# Patient Record
Sex: FEMALE | Race: BLACK OR AFRICAN AMERICAN | NOT HISPANIC OR LATINO | Employment: OTHER | URBAN - METROPOLITAN AREA
[De-identification: names, ages, dates, MRNs, and addresses within clinical notes are randomized per-mention and may not be internally consistent; named-entity substitution may affect disease eponyms.]

---

## 2018-06-03 ENCOUNTER — APPOINTMENT (EMERGENCY)
Dept: RADIOLOGY | Facility: HOSPITAL | Age: 23
End: 2018-06-03
Attending: EMERGENCY MEDICINE
Payer: COMMERCIAL

## 2018-06-03 ENCOUNTER — HOSPITAL ENCOUNTER (EMERGENCY)
Facility: HOSPITAL | Age: 23
Discharge: HOME | End: 2018-06-03
Attending: EMERGENCY MEDICINE
Payer: COMMERCIAL

## 2018-06-03 VITALS
DIASTOLIC BLOOD PRESSURE: 63 MMHG | OXYGEN SATURATION: 100 % | TEMPERATURE: 98.3 F | RESPIRATION RATE: 19 BRPM | HEART RATE: 83 BPM | BODY MASS INDEX: 23.92 KG/M2 | SYSTOLIC BLOOD PRESSURE: 105 MMHG | WEIGHT: 135 LBS | HEIGHT: 63 IN

## 2018-06-03 DIAGNOSIS — L02.91 ABSCESS: Primary | ICD-10-CM

## 2018-06-03 LAB
B-HCG UR QL: NEGATIVE
BACTERIA URNS QL MICRO: 1 /UL
BILIRUB UR QL STRIP.AUTO: NEGATIVE MG/DL
CLARITY UR REFRACT.AUTO: ABNORMAL
COLOR UR AUTO: YELLOW
GLUCOSE UR STRIP.AUTO-MCNC: NEGATIVE MG/DL
HGB UR QL STRIP.AUTO: NEGATIVE
HYALINE CASTS #/AREA URNS LPF: ABNORMAL /LPF
KETONES UR STRIP.AUTO-MCNC: NEGATIVE MG/DL
LEUKOCYTE ESTERASE UR QL STRIP.AUTO: NEGATIVE
NITRITE UR QL STRIP.AUTO: NEGATIVE
PH UR STRIP.AUTO: 7 [PH]
PROT UR QL STRIP.AUTO: 1
RBC #/AREA URNS HPF: ABNORMAL /HPF
SP GR UR REFRACT.AUTO: 1.02
SQUAMOUS URNS QL MICRO: 1 /HPF
UROBILINOGEN UR STRIP-ACNC: 0.2 EU/DL
WBC #/AREA URNS HPF: ABNORMAL /HPF

## 2018-06-03 PROCEDURE — 99283 EMERGENCY DEPT VISIT LOW MDM: CPT | Mod: 25

## 2018-06-03 PROCEDURE — 81001 URINALYSIS AUTO W/SCOPE: CPT | Performed by: PHYSICIAN ASSISTANT

## 2018-06-03 PROCEDURE — 72170 X-RAY EXAM OF PELVIS: CPT

## 2018-06-03 PROCEDURE — 63700000 HC SELF-ADMINISTRABLE DRUG: Performed by: PHYSICIAN ASSISTANT

## 2018-06-03 RX ORDER — IBUPROFEN 600 MG/1
600 TABLET ORAL EVERY 6 HOURS PRN
Qty: 20 TABLET | Refills: 0 | OUTPATIENT
Start: 2018-06-03 | End: 2022-11-21

## 2018-06-03 RX ORDER — SULFAMETHOXAZOLE AND TRIMETHOPRIM 800; 160 MG/1; MG/1
1 TABLET ORAL 2 TIMES DAILY
Qty: 14 TABLET | Refills: 0 | Status: SHIPPED | OUTPATIENT
Start: 2018-06-03 | End: 2018-06-10

## 2018-06-03 RX ORDER — IBUPROFEN 600 MG/1
600 TABLET ORAL ONCE
Status: COMPLETED | OUTPATIENT
Start: 2018-06-03 | End: 2018-06-03

## 2018-06-03 RX ADMIN — IBUPROFEN 600 MG: 600 TABLET, FILM COATED ORAL at 18:09

## 2018-06-03 ASSESSMENT — ENCOUNTER SYMPTOMS
PALPITATIONS: 0
COUGH: 0
DYSURIA: 0
COLOR CHANGE: 0
VOMITING: 0
EYE PAIN: 0
ABDOMINAL PAIN: 0
SHORTNESS OF BREATH: 0
SORE THROAT: 0
SEIZURES: 0
BACK PAIN: 0
ARTHRALGIAS: 0
CHILLS: 0
FEVER: 0
HEMATURIA: 0

## 2018-06-04 NOTE — ED ATTESTATION NOTE
The patient was evaluated and managed by the physician assistant / nurse practitioner.  santiago Jefferson MD  06/03/18 8796

## 2019-12-18 ENCOUNTER — HOSPITAL ENCOUNTER (EMERGENCY)
Facility: HOSPITAL | Age: 24
Discharge: HOME | End: 2019-12-18
Attending: EMERGENCY MEDICINE
Payer: COMMERCIAL

## 2019-12-18 ENCOUNTER — APPOINTMENT (EMERGENCY)
Dept: RADIOLOGY | Facility: HOSPITAL | Age: 24
End: 2019-12-18
Payer: COMMERCIAL

## 2019-12-18 VITALS
HEIGHT: 63 IN | DIASTOLIC BLOOD PRESSURE: 56 MMHG | RESPIRATION RATE: 16 BRPM | OXYGEN SATURATION: 100 % | SYSTOLIC BLOOD PRESSURE: 105 MMHG | TEMPERATURE: 99.7 F | WEIGHT: 150 LBS | HEART RATE: 102 BPM | BODY MASS INDEX: 26.58 KG/M2

## 2019-12-18 DIAGNOSIS — R10.9 ABDOMINAL PAIN, VOMITING, AND DIARRHEA: Primary | ICD-10-CM

## 2019-12-18 DIAGNOSIS — K52.9 COLITIS: ICD-10-CM

## 2019-12-18 DIAGNOSIS — R11.10 ABDOMINAL PAIN, VOMITING, AND DIARRHEA: Primary | ICD-10-CM

## 2019-12-18 DIAGNOSIS — R19.7 ABDOMINAL PAIN, VOMITING, AND DIARRHEA: Primary | ICD-10-CM

## 2019-12-18 LAB
ALBUMIN SERPL-MCNC: 4.1 G/DL (ref 3.4–5)
ALP SERPL-CCNC: 37 IU/L (ref 35–126)
ALT SERPL-CCNC: 18 IU/L (ref 11–54)
ANION GAP SERPL CALC-SCNC: 9 MEQ/L (ref 3–15)
AST SERPL-CCNC: 20 IU/L (ref 15–41)
B-HCG UR QL: NEGATIVE
BACTERIA URNS QL MICRO: ABNORMAL /HPF
BASOPHILS # BLD: 0.02 K/UL (ref 0.01–0.1)
BASOPHILS NFR BLD: 0.2 %
BILIRUB SERPL-MCNC: 0.7 MG/DL (ref 0.3–1.2)
BILIRUB UR QL STRIP.AUTO: NEGATIVE MG/DL
BUN SERPL-MCNC: 10 MG/DL (ref 8–20)
CALCIUM SERPL-MCNC: 9.4 MG/DL (ref 8.9–10.3)
CHLORIDE SERPL-SCNC: 112 MEQ/L (ref 98–109)
CLARITY UR REFRACT.AUTO: CLEAR
CO2 SERPL-SCNC: 18 MEQ/L (ref 22–32)
COLOR UR AUTO: YELLOW
CREAT SERPL-MCNC: 0.7 MG/DL
DIFFERENTIAL METHOD BLD: ABNORMAL
EOSINOPHIL # BLD: 0.09 K/UL (ref 0.04–0.36)
EOSINOPHIL NFR BLD: 1 %
ERYTHROCYTE [DISTWIDTH] IN BLOOD BY AUTOMATED COUNT: 13.2 % (ref 11.7–14.4)
GFR SERPL CREATININE-BSD FRML MDRD: >60 ML/MIN/1.73M*2
GLUCOSE SERPL-MCNC: 88 MG/DL (ref 70–99)
GLUCOSE UR STRIP.AUTO-MCNC: NEGATIVE MG/DL
HCG UR QL: NEGATIVE
HCT VFR BLDCO AUTO: 41.5 %
HGB BLD-MCNC: 13.8 G/DL (ref 11.8–15.7)
HGB UR QL STRIP.AUTO: ABNORMAL
HYALINE CASTS #/AREA URNS LPF: ABNORMAL /LPF
IMM GRANULOCYTES # BLD AUTO: 0.01 K/UL (ref 0–0.08)
IMM GRANULOCYTES NFR BLD AUTO: 0.1 %
KETONES UR STRIP.AUTO-MCNC: NEGATIVE MG/DL
LEUKOCYTE ESTERASE UR QL STRIP.AUTO: NEGATIVE
LIPASE SERPL-CCNC: 24 U/L (ref 20–51)
LYMPHOCYTES # BLD: 0.68 K/UL (ref 1.2–3.5)
LYMPHOCYTES NFR BLD: 7.3 %
MCH RBC QN AUTO: 29.6 PG (ref 28–33.2)
MCHC RBC AUTO-ENTMCNC: 33.3 G/DL (ref 32.2–35.5)
MCV RBC AUTO: 88.9 FL (ref 83–98)
MONOCYTES # BLD: 0.48 K/UL (ref 0.28–0.8)
MONOCYTES NFR BLD: 5.1 %
NEUTROPHILS # BLD: 8.05 K/UL (ref 1.7–7)
NEUTS SEG NFR BLD: 86.3 %
NITRITE UR QL STRIP.AUTO: NEGATIVE
NRBC BLD-RTO: 0 %
PDW BLD AUTO: 9.7 FL (ref 9.4–12.3)
PH UR STRIP.AUTO: 6 [PH]
PLATELET # BLD AUTO: 234 K/UL
POCT TEST: NORMAL
POTASSIUM SERPL-SCNC: 4 MEQ/L (ref 3.6–5.1)
PROT SERPL-MCNC: 6.9 G/DL (ref 6–8.2)
PROT UR QL STRIP.AUTO: NEGATIVE
RBC # BLD AUTO: 4.67 M/UL (ref 3.93–5.22)
RBC #/AREA URNS HPF: ABNORMAL /HPF
SODIUM SERPL-SCNC: 139 MEQ/L (ref 136–144)
SP GR UR REFRACT.AUTO: 1.02
SQUAMOUS URNS QL MICRO: 2 /HPF
UROBILINOGEN UR STRIP-ACNC: 0.2 EU/DL
WBC # BLD AUTO: 9.33 K/UL
WBC #/AREA URNS HPF: ABNORMAL /HPF

## 2019-12-18 PROCEDURE — 96374 THER/PROPH/DIAG INJ IV PUSH: CPT | Mod: 59

## 2019-12-18 PROCEDURE — 80053 COMPREHEN METABOLIC PANEL: CPT | Performed by: PHYSICIAN ASSISTANT

## 2019-12-18 PROCEDURE — 25000000 HC PHARMACY GENERAL: Performed by: PHYSICIAN ASSISTANT

## 2019-12-18 PROCEDURE — 3E033GC INTRODUCTION OF OTHER THERAPEUTIC SUBSTANCE INTO PERIPHERAL VEIN, PERCUTANEOUS APPROACH: ICD-10-PCS | Performed by: EMERGENCY MEDICINE

## 2019-12-18 PROCEDURE — 3E0337Z INTRODUCTION OF ELECTROLYTIC AND WATER BALANCE SUBSTANCE INTO PERIPHERAL VEIN, PERCUTANEOUS APPROACH: ICD-10-PCS | Performed by: EMERGENCY MEDICINE

## 2019-12-18 PROCEDURE — 83690 ASSAY OF LIPASE: CPT | Performed by: PHYSICIAN ASSISTANT

## 2019-12-18 PROCEDURE — 84703 CHORIONIC GONADOTROPIN ASSAY: CPT | Performed by: PHYSICIAN ASSISTANT

## 2019-12-18 PROCEDURE — 63700000 HC SELF-ADMINISTRABLE DRUG: Performed by: PHYSICIAN ASSISTANT

## 2019-12-18 PROCEDURE — 63600000 HC DRUGS/DETAIL CODE: Performed by: PHYSICIAN ASSISTANT

## 2019-12-18 PROCEDURE — 96376 TX/PRO/DX INJ SAME DRUG ADON: CPT | Mod: 59

## 2019-12-18 PROCEDURE — 96361 HYDRATE IV INFUSION ADD-ON: CPT | Mod: 59

## 2019-12-18 PROCEDURE — 36415 COLL VENOUS BLD VENIPUNCTURE: CPT | Performed by: PHYSICIAN ASSISTANT

## 2019-12-18 PROCEDURE — 85025 COMPLETE CBC W/AUTO DIFF WBC: CPT | Performed by: PHYSICIAN ASSISTANT

## 2019-12-18 PROCEDURE — 96375 TX/PRO/DX INJ NEW DRUG ADDON: CPT | Mod: 59

## 2019-12-18 PROCEDURE — 81001 URINALYSIS AUTO W/SCOPE: CPT

## 2019-12-18 PROCEDURE — 3E033NZ INTRODUCTION OF ANALGESICS, HYPNOTICS, SEDATIVES INTO PERIPHERAL VEIN, PERCUTANEOUS APPROACH: ICD-10-PCS | Performed by: EMERGENCY MEDICINE

## 2019-12-18 PROCEDURE — 25800000 HC PHARMACY IV SOLUTIONS: Performed by: PHYSICIAN ASSISTANT

## 2019-12-18 PROCEDURE — 99284 EMERGENCY DEPT VISIT MOD MDM: CPT | Mod: 25

## 2019-12-18 PROCEDURE — 81001 URINALYSIS AUTO W/SCOPE: CPT | Performed by: EMERGENCY MEDICINE

## 2019-12-18 PROCEDURE — 74177 CT ABD & PELVIS W/CONTRAST: CPT

## 2019-12-18 PROCEDURE — 63600105 HC IODINE BASED CONTRAST: Performed by: PHYSICIAN ASSISTANT

## 2019-12-18 RX ORDER — ACETAMINOPHEN 325 MG/1
975 TABLET ORAL ONCE
Status: COMPLETED | OUTPATIENT
Start: 2019-12-18 | End: 2019-12-18

## 2019-12-18 RX ORDER — MORPHINE SULFATE 2 MG/ML
2 INJECTION, SOLUTION INTRAMUSCULAR; INTRAVENOUS ONCE
Status: COMPLETED | OUTPATIENT
Start: 2019-12-18 | End: 2019-12-18

## 2019-12-18 RX ORDER — METRONIDAZOLE 500 MG/1
500 TABLET ORAL ONCE
Status: COMPLETED | OUTPATIENT
Start: 2019-12-18 | End: 2019-12-18

## 2019-12-18 RX ORDER — FAMOTIDINE 10 MG/ML
20 INJECTION INTRAVENOUS ONCE
Status: COMPLETED | OUTPATIENT
Start: 2019-12-18 | End: 2019-12-18

## 2019-12-18 RX ORDER — ONDANSETRON HYDROCHLORIDE 2 MG/ML
4 INJECTION, SOLUTION INTRAVENOUS ONCE
Status: COMPLETED | OUTPATIENT
Start: 2019-12-18 | End: 2019-12-18

## 2019-12-18 RX ORDER — CIPROFLOXACIN 500 MG/1
500 TABLET ORAL ONCE
Status: COMPLETED | OUTPATIENT
Start: 2019-12-18 | End: 2019-12-18

## 2019-12-18 RX ORDER — CIPROFLOXACIN 500 MG/1
500 TABLET ORAL 2 TIMES DAILY
Qty: 13 TABLET | Refills: 0 | Status: SHIPPED | OUTPATIENT
Start: 2019-12-18 | End: 2019-12-25

## 2019-12-18 RX ORDER — METRONIDAZOLE 500 MG/1
500 TABLET ORAL 3 TIMES DAILY
Qty: 20 TABLET | Refills: 0 | Status: SHIPPED | OUTPATIENT
Start: 2019-12-18 | End: 2019-12-25

## 2019-12-18 RX ORDER — ONDANSETRON 4 MG/1
4 TABLET, ORALLY DISINTEGRATING ORAL EVERY 8 HOURS PRN
Qty: 6 TABLET | Refills: 0 | Status: SHIPPED | OUTPATIENT
Start: 2019-12-18 | End: 2023-05-04

## 2019-12-18 RX ORDER — MORPHINE SULFATE 2 MG/ML
4 INJECTION, SOLUTION INTRAMUSCULAR; INTRAVENOUS ONCE
Status: COMPLETED | OUTPATIENT
Start: 2019-12-18 | End: 2019-12-18

## 2019-12-18 RX ADMIN — MORPHINE SULFATE 4 MG: 2 INJECTION, SOLUTION INTRAMUSCULAR; INTRAVENOUS at 15:09

## 2019-12-18 RX ADMIN — METRONIDAZOLE 500 MG: 500 TABLET, FILM COATED ORAL at 19:29

## 2019-12-18 RX ADMIN — MORPHINE SULFATE 2 MG: 2 INJECTION, SOLUTION INTRAMUSCULAR; INTRAVENOUS at 14:34

## 2019-12-18 RX ADMIN — ONDANSETRON HYDROCHLORIDE 4 MG: 2 SOLUTION INTRAMUSCULAR; INTRAVENOUS at 14:33

## 2019-12-18 RX ADMIN — IOHEXOL 85 ML: 350 INJECTION, SOLUTION INTRAVENOUS at 17:24

## 2019-12-18 RX ADMIN — SODIUM CHLORIDE 2000 ML: 9 INJECTION, SOLUTION INTRAVENOUS at 14:10

## 2019-12-18 RX ADMIN — ACETAMINOPHEN 975 MG: 325 TABLET, FILM COATED ORAL at 18:48

## 2019-12-18 RX ADMIN — FAMOTIDINE 20 MG: 10 INJECTION INTRAVENOUS at 14:34

## 2019-12-18 RX ADMIN — CIPROFLOXACIN HYDROCHLORIDE 500 MG: 500 TABLET, FILM COATED ORAL at 19:29

## 2019-12-18 ASSESSMENT — ENCOUNTER SYMPTOMS
HEADACHES: 0
SEIZURES: 0
EYE PAIN: 0
DIARRHEA: 1
ARTHRALGIAS: 0
PALPITATIONS: 0
COUGH: 0
FEVER: 0
SHORTNESS OF BREATH: 0
FATIGUE: 0
ABDOMINAL PAIN: 1
CHILLS: 1
DYSURIA: 0
MYALGIAS: 0
HEMATURIA: 0
SORE THROAT: 0
NAUSEA: 1
COLOR CHANGE: 0
BACK PAIN: 0
VOMITING: 1
DIFFICULTY URINATING: 0

## 2019-12-18 NOTE — ED PROVIDER NOTES
HPI     Chief Complaint   Patient presents with   • Flu Symptoms       24 year old female who presents to the ED with multiple episodes of nasuea, vomiting, diarrhea, as well as diffused abdominal cramping since yesterday evening.  Associated chills.  She states that she has been unable to keep down food or drinks. She denies recent travel or sick contacts. She reports approximately 3 episodes of vomiting and 4 episodes of watery diarrhea.  Denies melena/.  She denies urinary symptoms, back pain, fever, chest pain, difficulty breathing, cough.      History provided by:  Patient   used: No    Abdominal Pain   Pain location:  Epigastric and RLQ  Pain quality: aching    Pain severity:  Moderate  Onset quality:  Gradual  Duration:  1 day  Timing:  Constant  Progression:  Worsening  Chronicity:  New  Relieved by:  None tried  Worsened by:  Palpation and movement  Ineffective treatments:  None tried  Associated symptoms: chills, diarrhea, nausea and vomiting    Associated symptoms: no chest pain, no cough, no dysuria, no fatigue, no fever, no hematuria, no shortness of breath and no sore throat         Patient History     History reviewed. No pertinent past medical history.    History reviewed. No pertinent surgical history.    History reviewed. No pertinent family history.    Social History     Tobacco Use   • Smoking status: Never Smoker   • Smokeless tobacco: Never Used   Substance Use Topics   • Alcohol use: No   • Drug use: Yes     Types: Marijuana       Systems Reviewed from Nursing Triage:          Review of Systems     Review of Systems   Constitutional: Positive for chills. Negative for fatigue and fever.   HENT: Negative for ear pain and sore throat.    Eyes: Negative for pain and visual disturbance.   Respiratory: Negative for cough and shortness of breath.    Cardiovascular: Negative for chest pain and palpitations.   Gastrointestinal: Positive for abdominal pain, diarrhea, nausea and  "vomiting.   Genitourinary: Negative for difficulty urinating, dysuria, hematuria and pelvic pain.   Musculoskeletal: Negative for arthralgias, back pain and myalgias.   Skin: Negative for color change and rash.   Neurological: Negative for seizures, syncope and headaches.   All other systems reviewed and are negative.       Physical Exam     ED Triage Vitals   Temp Heart Rate Resp BP SpO2   12/18/19 1323 12/18/19 1322 12/18/19 1323 12/18/19 1323 12/18/19 1322   37.2 °C (98.9 °F) (!) 116 15 123/68 97 %      Temp Source Heart Rate Source Patient Position BP Location FiO2 (%) (Set)   12/18/19 1323 12/18/19 1323 12/18/19 1323 12/18/19 1323 --   Oral Monitor Sitting Left upper arm                      Patient Vitals for the past 24 hrs:   BP Temp Temp src Pulse Resp SpO2 Height Weight   12/18/19 1342 -- -- -- -- -- -- 1.6 m (5' 3\") 68 kg (150 lb)   12/18/19 1323 123/68 37.2 °C (98.9 °F) Oral 89 15 100 % -- --   12/18/19 1322 -- -- -- (!) 116 -- 97 % -- --                                       Physical Exam   Constitutional: She is oriented to person, place, and time. She appears well-developed and well-nourished. No distress.   HENT:   Head: Normocephalic and atraumatic.   Eyes: Conjunctivae are normal. No scleral icterus.   Neck: Neck supple.   Cardiovascular: Normal rate, regular rhythm, normal heart sounds and intact distal pulses.   Pulmonary/Chest: Effort normal and breath sounds normal. No respiratory distress.   Abdominal: Soft. Bowel sounds are normal. There is tenderness in the right lower quadrant and epigastric area.   RLQ and mid-epigastric    Musculoskeletal: She exhibits no edema.   Neurological: She is alert and oriented to person, place, and time. She has normal strength.   Skin: Skin is warm and dry. Capillary refill takes less than 2 seconds. She is not diaphoretic.   Psychiatric: She has a normal mood and affect. Her behavior is normal.   Nursing note and vitals reviewed.           Procedures    ED " Course & MDM     Labs Reviewed   RSV AND INFLUENZA A/B NUCLEIC ACIDS, PCR   URINALYSIS REFLEX CULTURE (ED AND OUTPATIENT ONLY)    Narrative:     The following orders were created for panel order Urinalysis w/ reflex culture.  Procedure                               Abnormality         Status                     ---------                               -----------         ------                     UA Reflex to Culture (Ma...[[id number]]                                                   Please view results for these tests on the individual orders.   UA REFLEX CULTURE (MACROSCOPIC)   BEDSIDE PREGNANCY, URINE       No orders to display               MDM  Number of Diagnoses or Management Options  Diagnosis management comments: 24-year-old female presenting for abdominal pain, nausea, vomiting, diarrhea.  Patient is in NAD.  She is intermittently tachycardic with otherwise stable vitals.  No peritoneal signs on exam.  Nonseptic appearing    Labs obtained, CT ordered, patient given IV fluids, antiemetics and analgesics           ED Course as of Dec 18 1541   Wed Dec 18, 2019   1540 Contaminated UA, no urinary sx, likely noninfectious    Urinalysis w/ reflex culture(!) [RM]      ED Course User Index  [RM] Cj Thorpe PA C      Scribe Attestation  By signing my name below, I, Weston Mcclelland, attest that this documentation has been prepared under the direction and in the presence of ROSARIO Thorpe PA-C.    12/18/2019 2:01 PM    Weston Greene  12/18/19 1405       Cj Thorpe PA C  12/18/19 1543

## 2019-12-19 NOTE — DISCHARGE INSTRUCTIONS
Increase fluids, rest  Followup with gastroenterology within 2-3 days. Bring paperwork from today's visit and discuss all labs or imaging results   Return to the ER immediately for any worsening symptoms or other concerns

## 2019-12-19 NOTE — ED ATTESTATION NOTE
The patient was evaluated and managed by the physician assistant.  This case was discussed with me and I was available for consultation.       Hans Mustafa MD  12/18/19 5823

## 2020-10-27 ENCOUNTER — HOSPITAL ENCOUNTER (EMERGENCY)
Facility: HOSPITAL | Age: 25
Discharge: HOME | End: 2020-10-27
Attending: EMERGENCY MEDICINE
Payer: COMMERCIAL

## 2020-10-27 ENCOUNTER — APPOINTMENT (EMERGENCY)
Dept: RADIOLOGY | Facility: HOSPITAL | Age: 25
End: 2020-10-27
Payer: COMMERCIAL

## 2020-10-27 VITALS
HEART RATE: 80 BPM | OXYGEN SATURATION: 100 % | WEIGHT: 150 LBS | SYSTOLIC BLOOD PRESSURE: 110 MMHG | DIASTOLIC BLOOD PRESSURE: 55 MMHG | RESPIRATION RATE: 19 BRPM | TEMPERATURE: 98.6 F | BODY MASS INDEX: 26.58 KG/M2 | HEIGHT: 63 IN

## 2020-10-27 DIAGNOSIS — J06.9 VIRAL UPPER RESPIRATORY ILLNESS: Primary | ICD-10-CM

## 2020-10-27 DIAGNOSIS — J45.909 ASTHMA, UNSPECIFIED ASTHMA SEVERITY, UNSPECIFIED WHETHER COMPLICATED, UNSPECIFIED WHETHER PERSISTENT: ICD-10-CM

## 2020-10-27 PROCEDURE — 99283 EMERGENCY DEPT VISIT LOW MDM: CPT | Mod: 25

## 2020-10-27 PROCEDURE — 71045 X-RAY EXAM CHEST 1 VIEW: CPT

## 2020-10-27 PROCEDURE — 63700000 HC SELF-ADMINISTRABLE DRUG: Performed by: PHYSICIAN ASSISTANT

## 2020-10-27 RX ORDER — PREDNISONE 20 MG/1
TABLET ORAL
Status: DISCONTINUED
Start: 2020-10-27 | End: 2020-10-27 | Stop reason: HOSPADM

## 2020-10-27 RX ORDER — ALBUTEROL SULFATE 90 UG/1
INHALANT RESPIRATORY (INHALATION)
Status: DISCONTINUED
Start: 2020-10-27 | End: 2020-10-27 | Stop reason: HOSPADM

## 2020-10-27 RX ORDER — ALBUTEROL SULFATE 90 UG/1
2 INHALANT RESPIRATORY (INHALATION) EVERY 20 MIN
Status: ACTIVE | OUTPATIENT
Start: 2020-10-27 | End: 2020-10-27

## 2020-10-27 RX ORDER — PREDNISONE 50 MG/1
50 TABLET ORAL DAILY
Qty: 10 TABLET | Refills: 0 | Status: SHIPPED | OUTPATIENT
Start: 2020-10-27 | End: 2023-05-04

## 2020-10-27 RX ORDER — PREDNISONE 50 MG/1
50 TABLET ORAL DAILY
Qty: 10 TABLET | Refills: 0 | Status: SHIPPED | OUTPATIENT
Start: 2020-10-27 | End: 2020-10-27 | Stop reason: SDUPTHER

## 2020-10-27 RX ADMIN — ALBUTEROL SULFATE 2 PUFF: 90 AEROSOL, METERED RESPIRATORY (INHALATION) at 15:02

## 2020-10-27 RX ADMIN — ALBUTEROL SULFATE 2 PUFF: 90 AEROSOL, METERED RESPIRATORY (INHALATION) at 15:32

## 2020-10-27 RX ADMIN — PREDNISONE 50 MG: 20 TABLET ORAL at 15:01

## 2020-10-27 ASSESSMENT — ENCOUNTER SYMPTOMS
DIARRHEA: 0
SPEECH DIFFICULTY: 0
DIAPHORESIS: 0
VOMITING: 0
HEMATURIA: 0
DIFFICULTY URINATING: 0
HEADACHES: 0
WHEEZING: 0
AGITATION: 0
WEAKNESS: 0
SHORTNESS OF BREATH: 1
ABDOMINAL PAIN: 0
SORE THROAT: 1
NAUSEA: 0
SEIZURES: 0
FEVER: 0
NECK PAIN: 0
COUGH: 0
FACIAL SWELLING: 0
COLOR CHANGE: 0
BACK PAIN: 0
ACTIVITY CHANGE: 0
RHINORRHEA: 1

## 2020-10-27 NOTE — DISCHARGE INSTRUCTIONS
Currently you have symptoms which seem consistent with viral upper respiratory illness that is exacerbating asthma.  I will discharge you with a prescription for prednisone.  I would take over-the-counter symptomatic treatment in order to help with sore throat.  Please follow-up with primary care provider for persistent symptoms.  Please follow-up in the emergency department for any new or worsening symptoms or concern.

## 2020-10-27 NOTE — ED PROVIDER NOTES
HPI     Chief Complaint   Patient presents with   • Asthma       25-year-old female with previous history of asthma presents complaining of shortness of breath and chest tightness over the last 2 days.  Patient also mentions that she has a sore throat.  Patient has had a productive cough.  Patient denies any nausea, vomiting or fever.  Patient denies any PERC criteria.  She has used her bronchodilator at home which has helped with her symptoms.  Patient was just recently Covid tested couple days back which was negative.           Patient History     Past Medical History:   Diagnosis Date   • Asthma        History reviewed. No pertinent surgical history.    History reviewed. No pertinent family history.    Social History     Tobacco Use   • Smoking status: Never Smoker   • Smokeless tobacco: Never Used   Substance Use Topics   • Alcohol use: No   • Drug use: Yes     Types: Marijuana       Systems Reviewed from Nursing Triage:          Review of Systems     Review of Systems   Constitutional: Negative for activity change, diaphoresis and fever.   HENT: Positive for rhinorrhea and sore throat. Negative for facial swelling.    Eyes: Negative for visual disturbance.   Respiratory: Positive for shortness of breath. Negative for cough and wheezing.    Cardiovascular: Positive for chest pain. Negative for leg swelling.   Gastrointestinal: Negative for abdominal pain, diarrhea, nausea and vomiting.   Genitourinary: Negative for difficulty urinating and hematuria.   Musculoskeletal: Negative for back pain and neck pain.   Skin: Negative for color change and pallor.   Neurological: Negative for seizures, syncope, speech difficulty, weakness and headaches.   Psychiatric/Behavioral: Negative for agitation and behavioral problems.   All other systems reviewed and are negative.       Physical Exam     ED Triage Vitals   Temp Heart Rate Resp BP SpO2   10/27/20 1333 10/27/20 1333 10/27/20 1333 10/27/20 1333 10/27/20 1333   37 °C  "(98.6 °F) 95 18 117/60 99 %      Temp Source Heart Rate Source Patient Position BP Location FiO2 (%) (Set)   10/27/20 1333 -- 10/27/20 1531 10/27/20 1531 --   Temporal  Lying Left upper arm        Pulse Ox %: 99 % (10/27/20 1721)  Pulse Ox Interpretation: Normal (10/27/20 1721)  Heart Rate: 85 (10/27/20 1721)  Rhythm Strip Interpretation: Normal Sinus Rhythm (10/27/20 1721)    Patient Vitals for the past 24 hrs:   BP Temp Temp src Pulse Resp SpO2 Height Weight   10/27/20 1531 (!) 107/58 -- -- 85 20 99 % -- --   10/27/20 1333 117/60 37 °C (98.6 °F) Temporal 95 18 99 % 1.6 m (5' 3\") 68 kg (150 lb)                                          Physical Exam  Vitals signs and nursing note reviewed.   Constitutional:       Appearance: She is well-developed.   HENT:      Head: Normocephalic and atraumatic.      Mouth/Throat:      Pharynx: Posterior oropharyngeal erythema present.   Eyes:      Conjunctiva/sclera: Conjunctivae normal.   Neck:      Musculoskeletal: Normal range of motion.   Cardiovascular:      Rate and Rhythm: Normal rate and regular rhythm.   Pulmonary:      Effort: Pulmonary effort is normal.      Breath sounds: Normal breath sounds.   Abdominal:      General: There is no distension.      Palpations: Abdomen is soft. There is no mass.      Tenderness: There is no abdominal tenderness.   Musculoskeletal: Normal range of motion.         General: No tenderness or deformity.   Skin:     General: Skin is warm and dry.   Neurological:      General: No focal deficit present.      Mental Status: She is alert. Mental status is at baseline.   Psychiatric:         Behavior: Behavior normal.              Procedures    Labs Reviewed - No data to display    X-RAY CHEST 1 VIEW   Final Result   IMPRESSION: No acute disease.                  ED Course & MDM     MDM currently patient has what is likely an asthma exacerbation from a viral upper respiratory illness.  Patient is denying Covid swab at this time.  Patient has been " advised to quarantine in place until she is asymptomatic for prolonged period of time.  Patient does not meet PERC criteria and she was not tacky prior to albuterol.  Patient feels much better post asthma treatment in emergency department.  Patient be discharged with prescription for prednisone.  Patient should follow-up for new or worsening symptoms of concern.         Clinical Impressions as of Oct 27 1725   Viral upper respiratory illness   Asthma, unspecified asthma severity, unspecified whether complicated, unspecified whether persistent        Mukund Landeros PA C  10/27/20 1738

## 2020-10-28 NOTE — ED ATTESTATION NOTE
The patient was evaluated and managed by the physician assistant.  I agree with the PA/NP’s history, physical, assessment, and plan of care, with the following exceptions: None       Linda Torres MD  10/27/20 2013

## 2020-12-13 ENCOUNTER — HOSPITAL ENCOUNTER (EMERGENCY)
Facility: HOSPITAL | Age: 25
Discharge: HOME | End: 2020-12-13
Attending: EMERGENCY MEDICINE
Payer: COMMERCIAL

## 2020-12-13 VITALS
HEIGHT: 63 IN | TEMPERATURE: 98.2 F | WEIGHT: 150 LBS | SYSTOLIC BLOOD PRESSURE: 112 MMHG | OXYGEN SATURATION: 100 % | HEART RATE: 80 BPM | DIASTOLIC BLOOD PRESSURE: 60 MMHG | RESPIRATION RATE: 16 BRPM | BODY MASS INDEX: 26.58 KG/M2

## 2020-12-13 DIAGNOSIS — H02.89 PAIN AND SWELLING OF LOWER EYELID OF RIGHT EYE: Primary | ICD-10-CM

## 2020-12-13 DIAGNOSIS — H02.842 PAIN AND SWELLING OF LOWER EYELID OF RIGHT EYE: Primary | ICD-10-CM

## 2020-12-13 PROCEDURE — 99281 EMR DPT VST MAYX REQ PHY/QHP: CPT

## 2020-12-13 PROCEDURE — 63700000 HC SELF-ADMINISTRABLE DRUG: Performed by: PHYSICIAN ASSISTANT

## 2020-12-13 RX ORDER — ERYTHROMYCIN 5 MG/G
OINTMENT OPHTHALMIC NIGHTLY
Qty: 3.5 G | Refills: 0 | Status: SHIPPED | OUTPATIENT
Start: 2020-12-13 | End: 2020-12-18

## 2020-12-13 RX ORDER — TETRACAINE HYDROCHLORIDE 5 MG/ML
SOLUTION OPHTHALMIC
Status: DISCONTINUED
Start: 2020-12-13 | End: 2020-12-13 | Stop reason: HOSPADM

## 2020-12-13 RX ORDER — ERYTHROMYCIN 5 MG/G
OINTMENT OPHTHALMIC ONCE
Status: COMPLETED | OUTPATIENT
Start: 2020-12-13 | End: 2020-12-13

## 2020-12-13 RX ADMIN — ERYTHROMYCIN 1 APPLICATION.: 5 OINTMENT OPHTHALMIC at 21:22

## 2020-12-13 ASSESSMENT — ENCOUNTER SYMPTOMS
VOMITING: 0
EYE PAIN: 1
BACK PAIN: 0
NAUSEA: 0
CHILLS: 0
SHORTNESS OF BREATH: 0
ABDOMINAL PAIN: 0
EYE REDNESS: 0
FEVER: 0
EYE DISCHARGE: 0
COUGH: 0
COLOR CHANGE: 0
DIZZINESS: 0
PHOTOPHOBIA: 0
LIGHT-HEADEDNESS: 0
HEADACHES: 0
EYE ITCHING: 0

## 2020-12-13 ASSESSMENT — VISUAL ACUITY: OU: 1

## 2020-12-14 NOTE — ED PROVIDER NOTES
HPI     Chief Complaint   Patient presents with   • Eye Problem   • Abrasion       HPI  This is a 25-year-old female presented to the ED with pain to the right lower eyelid, especially when she closes her eye. She says she was having fake eyelashes placed today and the girl cleaning her natural eyelashes spilled some of the material on her lower lashes, which caused them to clump together.SHe also feels a burning sensation at the lower lid especially when pulling on the skin. She also initially felt a   numbness around the eye.   Denies vision changes, light sensitivity, eye pain, FB sensation or discharge.      Patient History     Past Medical History:   Diagnosis Date   • Asthma        History reviewed. No pertinent surgical history.    History reviewed. No pertinent family history.    Social History     Tobacco Use   • Smoking status: Never Smoker   • Smokeless tobacco: Never Used   Substance Use Topics   • Alcohol use: No   • Drug use: Yes     Types: Marijuana       Systems Reviewed from Nursing Triage:          Review of Systems     Review of Systems   Constitutional: Negative for chills and fever.   Eyes: Positive for pain. Negative for photophobia, discharge, redness, itching and visual disturbance.   Respiratory: Negative for cough and shortness of breath.    Cardiovascular: Negative for chest pain.   Gastrointestinal: Negative for abdominal pain, nausea and vomiting.   Musculoskeletal: Negative for back pain.   Skin: Negative for color change.   Neurological: Negative for dizziness, syncope, light-headedness and headaches.   All other systems reviewed and are negative.       Physical Exam     ED Vitals    Date/Time Temp Pulse Resp BP SpO2 [facility]   12/13/20 2127 -- 80 -- 112/60 100 % BAT   12/13/20 2123 -- 80 -- 112/60 100 % BAT   12/13/20 2000 -- 82 -- 107/65 100 % BAT   12/13/20 1930 -- 90 16 118/61 99 % BAT   12/13/20 1900 36.8 °C (98.2 °F) 102 16 120/58 100 %                                                             Physical Exam  Vitals signs and nursing note reviewed.   Constitutional:       General: She is not in acute distress.     Appearance: Normal appearance. She is well-developed.   HENT:      Head: Normocephalic.   Eyes:      General: Lids are everted, no foreign bodies appreciated. Vision grossly intact.         Right eye: No foreign body or discharge.         Left eye: No discharge.      Extraocular Movements: Extraocular movements intact.      Conjunctiva/sclera: Conjunctivae normal.        Comments: Conjunctiva appears normal, no chemosis or injection. Lower lashes are clumped together with exudate, skin mild erythema   Neck:      Musculoskeletal: Normal range of motion and neck supple.   Cardiovascular:      Rate and Rhythm: Normal rate and regular rhythm.      Heart sounds: No murmur.   Pulmonary:      Effort: Pulmonary effort is normal. No respiratory distress.      Breath sounds: Normal breath sounds.   Abdominal:      Palpations: Abdomen is soft.      Tenderness: There is no abdominal tenderness.   Skin:     General: Skin is warm and dry.   Neurological:      Mental Status: She is alert and oriented to person, place, and time.   Psychiatric:         Mood and Affect: Mood normal.         Behavior: Behavior normal.               Procedures    Results     None          Imaging Results    None         No orders to display               ED Course & MDM     MDM  25-year-old female presented with right eye and R lower lid pain that began after suspected chemical exposure from cleansing agent used while getting eyelids placed today. Lower lid is mildly irritated.  Does not have VA changes, photophobia, eye pain or discharge. Applied tetracaine and performed fluorescein stain, no abrasion or FB detected. VA acuity normal. Irrigated eye gently followed by pH check of 7.0, lids were also rinsed with saline.  She will be referred to ophtho           ED Course as of Dec 13 2217   Sun Dec 13, 2020   2008 No  stain of cornea, will check VA and pH -     [JL]      ED Course User Index  [MILY] Sandhya Buckley PA C         Clinical Impressions as of Dec 13 2217   Pain and swelling of lower eyelid of right eye        Sandhya Buckley PA C  12/13/20 2218

## 2020-12-17 NOTE — ED ATTESTATION NOTE
The patient was evaluated and managed by the physician assistant under my supervision.   I agree with the PA's assessment and plan.       Tevin Ramirez MD  12/17/20 8535

## 2021-09-17 ENCOUNTER — HOSPITAL ENCOUNTER (EMERGENCY)
Facility: HOSPITAL | Age: 26
Discharge: HOME | End: 2021-09-18
Attending: EMERGENCY MEDICINE
Payer: COMMERCIAL

## 2021-09-17 DIAGNOSIS — R11.2 NON-INTRACTABLE VOMITING WITH NAUSEA, UNSPECIFIED VOMITING TYPE: Primary | ICD-10-CM

## 2021-09-17 LAB
ALBUMIN SERPL-MCNC: 4.4 G/DL (ref 3.4–5)
ALP SERPL-CCNC: 36 IU/L (ref 35–126)
ALT SERPL-CCNC: 19 IU/L (ref 11–54)
ANION GAP SERPL CALC-SCNC: 10 MEQ/L (ref 3–15)
AST SERPL-CCNC: 24 IU/L (ref 15–41)
B-HCG UR QL: NEGATIVE
BACTERIA #/AREA URNS HPF: ABNORMAL /HPF
BASOPHILS # BLD: 0.03 K/UL (ref 0.01–0.1)
BASOPHILS NFR BLD: 0.7 %
BILIRUB DIRECT SERPL-MCNC: 0.1 MG/DL
BILIRUB SERPL-MCNC: 0.3 MG/DL (ref 0.3–1.2)
BILIRUB UR QL STRIP.AUTO: NEGATIVE MG/DL
BUN SERPL-MCNC: 8 MG/DL (ref 8–20)
CALCIUM SERPL-MCNC: 9.3 MG/DL (ref 8.9–10.3)
CHLORIDE SERPL-SCNC: 105 MEQ/L (ref 98–109)
CLARITY UR REFRACT.AUTO: ABNORMAL
CO2 SERPL-SCNC: 23 MEQ/L (ref 22–32)
COLOR UR AUTO: YELLOW
CREAT SERPL-MCNC: 0.8 MG/DL (ref 0.6–1.1)
DIFFERENTIAL METHOD BLD: ABNORMAL
EOSINOPHIL # BLD: 0.01 K/UL (ref 0.04–0.36)
EOSINOPHIL NFR BLD: 0.2 %
ERYTHROCYTE [DISTWIDTH] IN BLOOD BY AUTOMATED COUNT: 13.3 % (ref 11.7–14.4)
GFR SERPL CREATININE-BSD FRML MDRD: >60 ML/MIN/1.73M*2
GLUCOSE SERPL-MCNC: 117 MG/DL (ref 70–99)
GLUCOSE UR STRIP.AUTO-MCNC: NEGATIVE MG/DL
HCG SERPL-ACNC: <0.6 IU/L (MIU/ML)
HCT VFR BLDCO AUTO: 45.7 % (ref 35–45)
HGB BLD-MCNC: 15.2 G/DL (ref 11.8–15.7)
HGB UR QL STRIP.AUTO: 3
HYALINE CASTS #/AREA URNS LPF: ABNORMAL /LPF
IMM GRANULOCYTES # BLD AUTO: 0.02 K/UL (ref 0–0.08)
IMM GRANULOCYTES NFR BLD AUTO: 0.5 %
KETONES UR STRIP.AUTO-MCNC: 2 MG/DL
LEUKOCYTE ESTERASE UR QL STRIP.AUTO: NEGATIVE
LIPASE SERPL-CCNC: 21 U/L (ref 20–51)
LYMPHOCYTES # BLD: 1.44 K/UL (ref 1.2–3.5)
LYMPHOCYTES NFR BLD: 34 %
MAGNESIUM SERPL-MCNC: 2.2 MG/DL (ref 1.8–2.5)
MCH RBC QN AUTO: 29.1 PG (ref 28–33.2)
MCHC RBC AUTO-ENTMCNC: 33.3 G/DL (ref 32.2–35.5)
MCV RBC AUTO: 87.4 FL (ref 83–98)
MONOCYTES # BLD: 0.42 K/UL (ref 0.28–0.8)
MONOCYTES NFR BLD: 9.9 %
MUCOUS THREADS URNS QL MICRO: 1 /LPF
NEUTROPHILS # BLD: 2.32 K/UL (ref 1.7–7)
NEUTS SEG NFR BLD: 54.7 %
NITRITE UR QL STRIP.AUTO: NEGATIVE
NRBC BLD-RTO: 0 %
PDW BLD AUTO: 9.8 FL (ref 9.4–12.3)
PH UR STRIP.AUTO: 6.5 [PH]
PLATELET # BLD AUTO: 223 K/UL (ref 150–369)
POCT TEST: NORMAL
POTASSIUM SERPL-SCNC: 3.9 MEQ/L (ref 3.6–5.1)
PROT SERPL-MCNC: 7.9 G/DL (ref 6–8.2)
PROT UR QL STRIP.AUTO: 1
RBC # BLD AUTO: 5.23 M/UL (ref 3.93–5.22)
RBC #/AREA URNS HPF: ABNORMAL /HPF
SODIUM SERPL-SCNC: 138 MEQ/L (ref 136–144)
SP GR UR REFRACT.AUTO: 1.02
SQUAMOUS URNS QL MICRO: 1 /HPF
UROBILINOGEN UR STRIP-ACNC: 0.2 EU/DL
WBC # BLD AUTO: 4.24 K/UL (ref 3.8–10.5)
WBC #/AREA URNS HPF: ABNORMAL /HPF

## 2021-09-17 PROCEDURE — 36415 COLL VENOUS BLD VENIPUNCTURE: CPT | Performed by: EMERGENCY MEDICINE

## 2021-09-17 PROCEDURE — 96374 THER/PROPH/DIAG INJ IV PUSH: CPT

## 2021-09-17 PROCEDURE — 80053 COMPREHEN METABOLIC PANEL: CPT | Performed by: EMERGENCY MEDICINE

## 2021-09-17 PROCEDURE — 63600000 HC DRUGS/DETAIL CODE: Performed by: PHYSICIAN ASSISTANT

## 2021-09-17 PROCEDURE — 83690 ASSAY OF LIPASE: CPT | Performed by: PHYSICIAN ASSISTANT

## 2021-09-17 PROCEDURE — 96361 HYDRATE IV INFUSION ADD-ON: CPT

## 2021-09-17 PROCEDURE — 84702 CHORIONIC GONADOTROPIN TEST: CPT | Performed by: EMERGENCY MEDICINE

## 2021-09-17 PROCEDURE — 82248 BILIRUBIN DIRECT: CPT | Performed by: PHYSICIAN ASSISTANT

## 2021-09-17 PROCEDURE — 25000000 HC PHARMACY GENERAL: Performed by: PHYSICIAN ASSISTANT

## 2021-09-17 PROCEDURE — 3E033GC INTRODUCTION OF OTHER THERAPEUTIC SUBSTANCE INTO PERIPHERAL VEIN, PERCUTANEOUS APPROACH: ICD-10-PCS | Performed by: EMERGENCY MEDICINE

## 2021-09-17 PROCEDURE — 63700000 HC SELF-ADMINISTRABLE DRUG: Performed by: PHYSICIAN ASSISTANT

## 2021-09-17 PROCEDURE — 96375 TX/PRO/DX INJ NEW DRUG ADDON: CPT

## 2021-09-17 PROCEDURE — 3E0337Z INTRODUCTION OF ELECTROLYTIC AND WATER BALANCE SUBSTANCE INTO PERIPHERAL VEIN, PERCUTANEOUS APPROACH: ICD-10-PCS | Performed by: EMERGENCY MEDICINE

## 2021-09-17 PROCEDURE — 85025 COMPLETE CBC W/AUTO DIFF WBC: CPT | Performed by: EMERGENCY MEDICINE

## 2021-09-17 PROCEDURE — 25800000 HC PHARMACY IV SOLUTIONS: Performed by: PHYSICIAN ASSISTANT

## 2021-09-17 PROCEDURE — 83735 ASSAY OF MAGNESIUM: CPT | Performed by: PHYSICIAN ASSISTANT

## 2021-09-17 PROCEDURE — 99284 EMERGENCY DEPT VISIT MOD MDM: CPT | Mod: 25

## 2021-09-17 PROCEDURE — 81001 URINALYSIS AUTO W/SCOPE: CPT | Performed by: EMERGENCY MEDICINE

## 2021-09-17 RX ORDER — LIDOCAINE HYDROCHLORIDE 20 MG/ML
10 SOLUTION OROPHARYNGEAL ONCE
Status: COMPLETED | OUTPATIENT
Start: 2021-09-17 | End: 2021-09-17

## 2021-09-17 RX ORDER — FAMOTIDINE 10 MG/ML
20 INJECTION INTRAVENOUS ONCE
Status: COMPLETED | OUTPATIENT
Start: 2021-09-17 | End: 2021-09-17

## 2021-09-17 RX ORDER — METOCLOPRAMIDE HYDROCHLORIDE 5 MG/ML
10 INJECTION INTRAMUSCULAR; INTRAVENOUS ONCE
Status: COMPLETED | OUTPATIENT
Start: 2021-09-17 | End: 2021-09-17

## 2021-09-17 RX ORDER — ONDANSETRON HYDROCHLORIDE 2 MG/ML
4 INJECTION, SOLUTION INTRAVENOUS ONCE
Status: COMPLETED | OUTPATIENT
Start: 2021-09-17 | End: 2021-09-17

## 2021-09-17 RX ORDER — ALUMINUM HYDROXIDE, MAGNESIUM HYDROXIDE, AND SIMETHICONE 1200; 120; 1200 MG/30ML; MG/30ML; MG/30ML
30 SUSPENSION ORAL ONCE
Status: COMPLETED | OUTPATIENT
Start: 2021-09-17 | End: 2021-09-17

## 2021-09-17 RX ADMIN — METOCLOPRAMIDE 10 MG: 5 INJECTION, SOLUTION INTRAMUSCULAR; INTRAVENOUS at 22:30

## 2021-09-17 RX ADMIN — LIDOCAINE HYDROCHLORIDE 10 ML: 20 SOLUTION ORAL; TOPICAL at 22:30

## 2021-09-17 RX ADMIN — SODIUM CHLORIDE 1000 ML: 9 INJECTION, SOLUTION INTRAVENOUS at 22:30

## 2021-09-17 RX ADMIN — FAMOTIDINE 20 MG: 10 INJECTION, SOLUTION INTRAVENOUS at 21:19

## 2021-09-17 RX ADMIN — ALUMINUM HYDROXIDE, MAGNESIUM HYDROXIDE, AND SIMETHICONE 30 ML: 200; 200; 20 SUSPENSION ORAL at 22:30

## 2021-09-17 RX ADMIN — SODIUM CHLORIDE 1000 ML: 900 INJECTION, SOLUTION INTRAVENOUS at 21:04

## 2021-09-17 RX ADMIN — ONDANSETRON HYDROCHLORIDE 4 MG: 2 SOLUTION INTRAMUSCULAR; INTRAVENOUS at 21:19

## 2021-09-18 VITALS
HEIGHT: 63 IN | OXYGEN SATURATION: 98 % | DIASTOLIC BLOOD PRESSURE: 60 MMHG | RESPIRATION RATE: 16 BRPM | SYSTOLIC BLOOD PRESSURE: 112 MMHG | TEMPERATURE: 99.2 F | BODY MASS INDEX: 25.69 KG/M2 | WEIGHT: 145 LBS | HEART RATE: 82 BPM

## 2021-09-18 RX ORDER — FAMOTIDINE 20 MG/1
20 TABLET, FILM COATED ORAL 2 TIMES DAILY
Qty: 10 TABLET | Refills: 0 | Status: SHIPPED | OUTPATIENT
Start: 2021-09-18 | End: 2023-05-04

## 2021-09-18 RX ORDER — SUCRALFATE 1 G/1
1 TABLET ORAL 4 TIMES DAILY
Qty: 40 TABLET | Refills: 0 | Status: SHIPPED | OUTPATIENT
Start: 2021-09-18 | End: 2023-05-04

## 2021-09-18 RX ORDER — ONDANSETRON 4 MG/1
4 TABLET, FILM COATED ORAL EVERY 6 HOURS PRN
Qty: 12 TABLET | Refills: 0 | Status: SHIPPED | OUTPATIENT
Start: 2021-09-18 | End: 2023-05-04

## 2021-09-18 ASSESSMENT — ENCOUNTER SYMPTOMS
HEADACHES: 0
DIAPHORESIS: 0
DIFFICULTY URINATING: 0
SEIZURES: 0
COUGH: 0
ACTIVITY CHANGE: 0
DIARRHEA: 1
BACK PAIN: 0
VOMITING: 0
APPETITE CHANGE: 1
DYSURIA: 0
NAUSEA: 1
HEMATURIA: 0
WHEEZING: 0
SORE THROAT: 0
SHORTNESS OF BREATH: 0
FATIGUE: 0
CHILLS: 1
FACIAL SWELLING: 0
COLOR CHANGE: 0
ABDOMINAL PAIN: 1
SPEECH DIFFICULTY: 0
WEAKNESS: 0
AGITATION: 0
NECK PAIN: 0
FEVER: 0

## 2021-09-18 NOTE — ED PROVIDER NOTES
"Emergency Medicine Note  HPI   HISTORY OF PRESENT ILLNESS     This is a 26-year old woman with history of asthma presenting to the emergency department 3 days of nausea vomiting and abdominal pain.  Symptoms are gradually been waxing and waning in intensity.  She describes the abdominal pain as a \"mild cramping that is worse with vomiting \"she denies any bad food exposure or any sick contacts.  She is currently menstruating.  She denies any fevers but has had intermittent chills.  She has no other complaints or concerns.            Patient History   PAST HISTORY     Reviewed from Nursing Triage:      Past Medical History:   Diagnosis Date   • Asthma        No past surgical history on file.    No family history on file.    Social History     Tobacco Use   • Smoking status: Never Smoker   • Smokeless tobacco: Never Used   Substance Use Topics   • Alcohol use: No   • Drug use: Yes     Types: Marijuana         Review of Systems   REVIEW OF SYSTEMS     Review of Systems   Constitutional: Positive for appetite change and chills. Negative for activity change, diaphoresis, fatigue and fever.   HENT: Negative for facial swelling and sore throat.    Eyes: Negative for visual disturbance.   Respiratory: Negative for cough, shortness of breath and wheezing.    Cardiovascular: Negative for chest pain and leg swelling.   Gastrointestinal: Positive for abdominal pain, diarrhea and nausea. Negative for vomiting.   Genitourinary: Positive for vaginal bleeding. Negative for difficulty urinating, dysuria, hematuria and pelvic pain.   Musculoskeletal: Negative for back pain and neck pain.   Skin: Negative for color change and pallor.   Neurological: Negative for seizures, syncope, speech difficulty, weakness and headaches.   Psychiatric/Behavioral: Negative for agitation and behavioral problems.   All other systems reviewed and are negative.        VITALS     ED Vitals    Date/Time Temp Pulse Resp BP SpO2 [facility]   09/17/21 2201 -- 80 20 " 110/58 99 % EE   09/17/21 2112 37.3 °C (99.2 °F) 84 17 118/81 99 % TC   09/17/21 2031 36.6 °C (97.9 °F) 107 18 118/70 99 % MD        Pulse Ox %: 99 % (09/18/21 0000)  Pulse Ox Interpretation: Normal (09/18/21 0000)           Physical Exam   PHYSICAL EXAM     Physical Exam  Vitals and nursing note reviewed.   Constitutional:       Appearance: She is well-developed.   HENT:      Head: Normocephalic and atraumatic.   Eyes:      Conjunctiva/sclera: Conjunctivae normal.   Cardiovascular:      Rate and Rhythm: Normal rate and regular rhythm.   Pulmonary:      Effort: Pulmonary effort is normal.      Breath sounds: Normal breath sounds.   Abdominal:      General: There is no distension.      Palpations: Abdomen is soft. There is no mass.      Tenderness: There is no abdominal tenderness.   Musculoskeletal:         General: No tenderness or deformity. Normal range of motion.      Cervical back: Normal range of motion.   Skin:     General: Skin is warm and dry.   Neurological:      General: No focal deficit present.      Mental Status: She is alert. Mental status is at baseline.   Psychiatric:         Behavior: Behavior normal.           PROCEDURES     Procedures     DATA     Results     Procedure Component Value Units Date/Time    Lipase [[id number]]  (Normal) Collected: 09/17/21 2103    Specimen: Blood, Venous Updated: 09/17/21 2311     Lipase 21 U/L     Magnesium [[id number]]  (Normal) Collected: 09/17/21 2103    Specimen: Blood, Venous Updated: 09/17/21 2311     Magnesium 2.2 mg/dL     Hepatic function panel [[id number]]  (Normal) Collected: 09/17/21 2103    Specimen: Blood, Venous Updated: 09/17/21 2311     Albumin 4.4 g/dL      Bilirubin, Total 0.3 mg/dL      Bilirubin, Direct 0.1 mg/dL      Alkaline Phosphatase 36 IU/L      AST (SGOT) 24 IU/L      ALT (SGPT) 19 IU/L      Total Protein 7.9 g/dL      Comment: Test performed on plasma which typically contains approximately 0.4 g/dL more protein than serum.       UA with  reflex culture [[id number]]  (Abnormal) Collected: 09/17/21 2159    Specimen: Urine, Clean Catch Updated: 09/17/21 2246    Narrative:      The following orders were created for panel order UA with reflex culture.  Procedure                               Abnormality         Status                     ---------                               -----------         ------                     UA Reflex to Culture (Ma...[[id number]]  Abnormal            Final result               UA Microscopic[[id number]]               Abnormal            Final result                 Please view results for these tests on the individual orders.    UA Microscopic [[id number]]  (Abnormal) Collected: 09/17/21 2159    Specimen: Urine, Clean Catch Updated: 09/17/21 2246     WBC, Urine 4 TO 10 /HPF      Squamous Epithelial +1 /hpf      Hyaline Cast 3 TO 9 /lpf      RBC, Urine Too Numerous To Count /HPF      Bacteria, Urine Rare /HPF      MUCUSUA +1 /LPF     UA Reflex to Culture (Macroscopic) [[id number]]  (Abnormal) Collected: 09/17/21 2159    Specimen: Urine, Clean Catch Updated: 09/17/21 2226     Color, Urine Yellow     Clarity, Urine Cloudy     Specific Gravity, Urine 1.021     pH, Urine 6.5     Leukocyte Esterase Negative     Comment: Results can be falsely negative due to high specific gravity, some antibiotics, glucose >3 g/dl, or WBC other than neutrophils.        Nitrite, Urine Negative     Protein, Urine +1     Glucose, Urine Negative mg/dL      Ketones, Urine +2 mg/dL      Comment: Free sulfhydryl drugs such as Mesna, Capoten, and Acetylcysteine (Mucomyst) may cause false positive ketonuria.        Urobilinogen, Urine 0.2 EU/dL      Bilirubin, Urine Negative mg/dL      Blood, Urine +3     Comment: The sensitivity of the occult blood test is equivalent to approximately 4 intact RBC/HPF.       BhCG, Serum, Quant [[id number]]  (Normal) Collected: 09/17/21 2103    Specimen: Blood, Venous Updated: 09/17/21 2147     hCG Quant <0.6 IU/L (mIU/mL)      Basic metabolic panel [[id number]]  (Abnormal) Collected: 09/17/21 2103    Specimen: Blood, Venous Updated: 09/17/21 2133     Sodium 138 mEQ/L      Potassium 3.9 mEQ/L      Comment: Results obtained on plasma. Plasma Potassium values may be up to 0.4 mEQ/L less than serum values. The differences may be greater for patients with high platelet or white cell counts.        Chloride 105 mEQ/L      CO2 23 mEQ/L      BUN 8 mg/dL      Creatinine 0.8 mg/dL      Glucose 117 mg/dL      Calcium 9.3 mg/dL      eGFR >60.0 mL/min/1.73m*2      Anion Gap 10 mEQ/L     CBC and differential [[id number]]  (Abnormal) Collected: 09/17/21 2103    Specimen: Blood, Venous Updated: 09/17/21 2118     WBC 4.24 K/uL      RBC 5.23 M/uL      Hemoglobin 15.2 g/dL      Hematocrit 45.7 %      MCV 87.4 fL      MCH 29.1 pg      MCHC 33.3 g/dL      RDW 13.3 %      Platelets 223 K/uL      MPV 9.8 fL      Differential Type Auto     nRBC 0.0 %      Immature Granulocytes 0.5 %      Neutrophils 54.7 %      Lymphocytes 34.0 %      Monocytes 9.9 %      Eosinophils 0.2 %      Basophils 0.7 %      Immature Granulocytes, Absolute 0.02 K/uL      Neutrophils, Absolute 2.32 K/uL      Lymphocytes, Absolute 1.44 K/uL      Monocytes, Absolute 0.42 K/uL      Eosinophils, Absolute 0.01 K/uL      Basophils, Absolute 0.03 K/uL     MARIA INES GOLD [[id number]] Collected: 09/17/21 2103    Specimen: Blood, Venous Updated: 09/17/21 2115    RAINBOW PINK [[id number]] Collected: 09/17/21 2103    Specimen: Blood, Venous Updated: 09/17/21 2115    Story Draw Panel [[id number]] Collected: 09/17/21 2103    Specimen: Blood, Venous Updated: 09/17/21 2115    Narrative:      The following orders were created for panel order Story Draw Panel.  Procedure                               Abnormality         Status                     ---------                               -----------         ------                     RAINBOW PINK[[id number]]                                     In process                  RAINBOW PINK[[id number]]                                     In process                 RAINBOW GOLD[[id number]]                                     In process                   Please view results for these tests on the individual orders.    RAINBOW PINK [[id number]] Collected: 09/17/21 2103    Specimen: Blood, Venous Updated: 09/17/21 2115          Imaging Results    None         No orders to display       Scoring tools                                 ED Course & MDM   MDM / ED COURSE and CLINICAL IMPRESSIONS     MDM    ED Course as of Sep 18 0003   Fri Sep 17, 2021   6519 Patient reports great relief.  She is comfortably tolerating p.o.  Abdomen remains soft and nontender.  Presentation consistent with gastritis.  I counseled to return with any new or worsening problems.    [DB]      ED Course User Index  [DB] Tim Stephenson PA C         Clinical Impressions as of Sep 18 0003   Non-intractable vomiting with nausea, unspecified vomiting type            Tim Stephenson PA C  09/18/21 0003

## 2021-09-20 NOTE — ED ATTESTATION NOTE
Irene Wyman was evaluated and managed by the physician assistant. I was available for immediate consult. I agree with the physician assistants interpretation of laboratory studies as well as the care provided to this patient.           Lux Kelly,   09/20/21 173

## 2022-02-16 ENCOUNTER — APPOINTMENT (RX ONLY)
Dept: URBAN - METROPOLITAN AREA CLINIC 28 | Facility: CLINIC | Age: 27
Setting detail: DERMATOLOGY
End: 2022-02-16

## 2022-02-16 DIAGNOSIS — L663 OTHER SPECIFIED DISEASES OF HAIR AND HAIR FOLLICLES: ICD-10-CM

## 2022-02-16 DIAGNOSIS — B35.0 TINEA BARBAE AND TINEA CAPITIS: ICD-10-CM

## 2022-02-16 DIAGNOSIS — L738 OTHER SPECIFIED DISEASES OF HAIR AND HAIR FOLLICLES: ICD-10-CM

## 2022-02-16 DIAGNOSIS — L73.9 FOLLICULAR DISORDER, UNSPECIFIED: ICD-10-CM

## 2022-02-16 PROBLEM — L02.222 FURUNCLE OF BACK [ANY PART, EXCEPT BUTTOCK]: Status: ACTIVE | Noted: 2022-02-16

## 2022-02-16 PROCEDURE — ? ORDER TESTS

## 2022-02-16 PROCEDURE — ? PRESCRIPTION MEDICATION MANAGEMENT

## 2022-02-16 PROCEDURE — 99203 OFFICE O/P NEW LOW 30 MIN: CPT

## 2022-02-16 PROCEDURE — ? PRESCRIPTION

## 2022-02-16 PROCEDURE — ? COUNSELING

## 2022-02-16 PROCEDURE — ? DIAGNOSIS COMMENT

## 2022-02-16 RX ORDER — CHLORHEXIDINE GLUCONATE 213 G/1000ML
SOLUTION TOPICAL DAILY
Qty: 946 | Refills: 3 | Status: ERX | COMMUNITY
Start: 2022-02-16 | End: 2024-01-04

## 2022-02-16 RX ORDER — KETOCONAZOLE 20 MG/ML
SHAMPOO, SUSPENSION TOPICAL
Qty: 120 | Refills: 3 | Status: ERX | COMMUNITY
Start: 2022-02-16 | End: 2024-01-04

## 2022-02-16 RX ORDER — MUPIROCIN 20 MG/G
OINTMENT TOPICAL BID
Qty: 22 | Refills: 1 | Status: ERX | COMMUNITY
Start: 2022-02-16 | End: 2024-01-04

## 2022-02-16 RX ORDER — FLUOCINOLONE ACETONIDE 0.11 MG/ML
OIL TOPICAL QHS
Qty: 118.28 | Refills: 1 | Status: CANCELLED | COMMUNITY
Start: 2022-02-16 | End: 2024-01-04

## 2022-02-16 RX ADMIN — MUPIROCIN: 20 OINTMENT TOPICAL at 00:00

## 2022-02-16 RX ADMIN — CHLORHEXIDINE GLUCONATE: 213 SOLUTION TOPICAL at 00:00

## 2022-02-16 RX ADMIN — FLUOCINOLONE ACETONIDE: 0.11 OIL TOPICAL at 00:00

## 2022-02-16 RX ADMIN — KETOCONAZOLE: 20 SHAMPOO, SUSPENSION TOPICAL at 00:00

## 2022-02-16 ASSESSMENT — LOCATION DETAILED DESCRIPTION DERM
LOCATION DETAILED: LEFT SUPERIOR UPPER BACK
LOCATION DETAILED: MID-OCCIPITAL SCALP

## 2022-02-16 ASSESSMENT — LOCATION SIMPLE DESCRIPTION DERM
LOCATION SIMPLE: POSTERIOR SCALP
LOCATION SIMPLE: LEFT UPPER BACK

## 2022-02-16 ASSESSMENT — LOCATION ZONE DERM
LOCATION ZONE: TRUNK
LOCATION ZONE: SCALP

## 2022-02-16 NOTE — PROCEDURE: PRESCRIPTION MEDICATION MANAGEMENT
Initiate Treatment: Derma-Smoothe/FS Scalp Oil 0.01 %: Apply QHS to scalp
Continue Regimen: ketoconazole 2 % shampoo: Wash scalp Twice a week
Detail Level: Zone
Render In Strict Bullet Format?: No
Initiate Treatment: Hibiclens 4 % topical liquid: Wash AA of body Qday; mupirocin 2 % topical ointment: Apply to AA of body BID
Detail Level: Generalized

## 2022-02-16 NOTE — PROCEDURE: DIAGNOSIS COMMENT
Comment: Scaly plaques on scalp: Tinea capitis vs. allergic contact dermatitis
Render Risk Assessment In Note?: no
Detail Level: Simple

## 2022-02-16 NOTE — HPI: RASH
What Type Of Note Output Would You Prefer (Optional)?: Standard Output
Is The Patient Presenting As Previously Scheduled?: Yes
How Severe Is Your Rash?: moderate
Is This A New Presentation, Or A Follow-Up?: Rash
Additional History: She got her hair dyed 1 month ago and had a reaction in her scalp from the dye and is now having a reaction through out her body. She currently uses Ketoconazole Shampoo. She also had a rash on her neck that started months ago and used Lotramin with improvement but it came back. She was also given Steroids a few times with no improvement.

## 2022-02-16 NOTE — PROCEDURE: ORDER TESTS
Billing Type: Third-Party Bill
Expected Date Of Service: 02/16/2022
Performing Laboratory: [id number]
Bill For Surgical Tray: no
Lab Facility: [id number]

## 2022-02-17 ENCOUNTER — RX ONLY (OUTPATIENT)
Age: 27
Setting detail: RX ONLY
End: 2022-02-17

## 2022-02-17 RX ORDER — FLUOCINOLONE ACETONIDE 0.11 MG/ML
OIL TOPICAL
Qty: 118.28 | Refills: 3 | Status: ERX | COMMUNITY
Start: 2022-02-17 | End: 2024-01-04

## 2022-04-20 ENCOUNTER — APPOINTMENT (RX ONLY)
Dept: URBAN - METROPOLITAN AREA CLINIC 28 | Facility: CLINIC | Age: 27
Setting detail: DERMATOLOGY
End: 2022-04-20

## 2022-04-20 DIAGNOSIS — L21.8 OTHER SEBORRHEIC DERMATITIS: ICD-10-CM | Status: IMPROVED

## 2022-04-20 DIAGNOSIS — L73.9 FOLLICULAR DISORDER, UNSPECIFIED: ICD-10-CM | Status: IMPROVED

## 2022-04-20 DIAGNOSIS — L663 OTHER SPECIFIED DISEASES OF HAIR AND HAIR FOLLICLES: ICD-10-CM | Status: IMPROVED

## 2022-04-20 DIAGNOSIS — L738 OTHER SPECIFIED DISEASES OF HAIR AND HAIR FOLLICLES: ICD-10-CM | Status: IMPROVED

## 2022-04-20 DIAGNOSIS — L85.3 XEROSIS CUTIS: ICD-10-CM

## 2022-04-20 PROBLEM — L02.424 FURUNCLE OF LEFT UPPER LIMB: Status: ACTIVE | Noted: 2022-04-20

## 2022-04-20 PROBLEM — L02.423 FURUNCLE OF RIGHT UPPER LIMB: Status: ACTIVE | Noted: 2022-04-20

## 2022-04-20 PROCEDURE — ? COUNSELING: TOPICAL STEROIDS

## 2022-04-20 PROCEDURE — ? TREATMENT REGIMEN

## 2022-04-20 PROCEDURE — 99213 OFFICE O/P EST LOW 20 MIN: CPT

## 2022-04-20 PROCEDURE — ? PRESCRIPTION MEDICATION MANAGEMENT

## 2022-04-20 PROCEDURE — ? COUNSELING

## 2022-04-20 ASSESSMENT — LOCATION SIMPLE DESCRIPTION DERM
LOCATION SIMPLE: LEFT FOREARM
LOCATION SIMPLE: RIGHT HAND
LOCATION SIMPLE: LEFT HAND
LOCATION SIMPLE: LEFT SCALP
LOCATION SIMPLE: RIGHT FOREARM

## 2022-04-20 ASSESSMENT — LOCATION ZONE DERM
LOCATION ZONE: SCALP
LOCATION ZONE: HAND
LOCATION ZONE: ARM

## 2022-04-20 ASSESSMENT — LOCATION DETAILED DESCRIPTION DERM
LOCATION DETAILED: RIGHT PROXIMAL ULNAR DORSAL FOREARM
LOCATION DETAILED: LEFT PROXIMAL DORSAL FOREARM
LOCATION DETAILED: LEFT MEDIAL FRONTAL SCALP
LOCATION DETAILED: RIGHT PROXIMAL DORSAL FOREARM
LOCATION DETAILED: LEFT DISTAL DORSAL FOREARM
LOCATION DETAILED: RIGHT RADIAL DORSAL HAND
LOCATION DETAILED: LEFT ULNAR DORSAL HAND

## 2022-04-20 NOTE — PROCEDURE: PRESCRIPTION MEDICATION MANAGEMENT
Render In Strict Bullet Format?: No
Detail Level: Zone
Continue Regimen: Hibiclens 4% liquid- wash AA of body QDAY\\nMupirocin 2% ointment- apply a thin layer BID to AA of body PRN flare
Continue Regimen: Dermasmooth FS oil- apply a thin layer to AA of scalp QOHS PRN itch( do not wash out)\\nKetoconazole 2% Shampoo- wash scalp every other week

## 2022-04-20 NOTE — PROCEDURE: TREATMENT REGIMEN
Action 3: Continue
Samples Given: Aquaphor Ointment, Neutrogena Norwegian hand cream , Cerave Cream
Detail Level: Zone

## 2022-04-20 NOTE — PROCEDURE: COUNSELING
Detail Level: Zone
Moisturizer Recommendations: Cerave cream followed by Vaseline or Aquaphor ointment BID to dry skin of body

## 2022-09-13 NOTE — DISCHARGE INSTRUCTIONS
Apply ointment to your lower eyelid for 5 nights. Continue to apply cold compresses. Schedule a follow-up appointment with ophthalmology.    Rifampin Counseling: I discussed with the patient the risks of rifampin including but not limited to liver damage, kidney damage, red-orange body fluids, nausea/vomiting and severe allergy.

## 2022-11-21 ENCOUNTER — HOSPITAL ENCOUNTER (EMERGENCY)
Facility: HOSPITAL | Age: 27
Discharge: HOME | End: 2022-11-21
Attending: EMERGENCY MEDICINE | Admitting: EMERGENCY MEDICINE
Payer: COMMERCIAL

## 2022-11-21 VITALS
DIASTOLIC BLOOD PRESSURE: 74 MMHG | WEIGHT: 150 LBS | TEMPERATURE: 97.2 F | HEART RATE: 75 BPM | SYSTOLIC BLOOD PRESSURE: 109 MMHG | RESPIRATION RATE: 17 BRPM | OXYGEN SATURATION: 100 % | BODY MASS INDEX: 26.58 KG/M2 | HEIGHT: 63 IN

## 2022-11-21 DIAGNOSIS — L02.411 CUTANEOUS ABSCESS OF RIGHT AXILLA: Primary | ICD-10-CM

## 2022-11-21 LAB
B-HCG UR QL: NEGATIVE
POCT TEST: NORMAL

## 2022-11-21 PROCEDURE — 0X940ZZ DRAINAGE OF RIGHT AXILLA, OPEN APPROACH: ICD-10-PCS | Performed by: EMERGENCY MEDICINE

## 2022-11-21 PROCEDURE — 99283 EMERGENCY DEPT VISIT LOW MDM: CPT | Mod: 25

## 2022-11-21 PROCEDURE — 63700000 HC SELF-ADMINISTRABLE DRUG: Performed by: EMERGENCY MEDICINE

## 2022-11-21 PROCEDURE — 10060 I&D ABSCESS SIMPLE/SINGLE: CPT | Performed by: EMERGENCY MEDICINE

## 2022-11-21 RX ORDER — IBUPROFEN 600 MG/1
600 TABLET ORAL EVERY 6 HOURS PRN
Qty: 40 TABLET | Refills: 0 | Status: SHIPPED | OUTPATIENT
Start: 2022-11-21 | End: 2023-05-04

## 2022-11-21 RX ORDER — IBUPROFEN 600 MG/1
600 TABLET ORAL ONCE
Status: COMPLETED | OUTPATIENT
Start: 2022-11-21 | End: 2022-11-21

## 2022-11-21 RX ORDER — SULFAMETHOXAZOLE AND TRIMETHOPRIM 800; 160 MG/1; MG/1
1 TABLET ORAL ONCE
Status: COMPLETED | OUTPATIENT
Start: 2022-11-21 | End: 2022-11-21

## 2022-11-21 RX ORDER — LIDOCAINE HYDROCHLORIDE AND EPINEPHRINE 10; 10 UG/ML; MG/ML
30 INJECTION, SOLUTION INFILTRATION; PERINEURAL ONCE
Status: DISCONTINUED | OUTPATIENT
Start: 2022-11-21 | End: 2022-11-21 | Stop reason: HOSPADM

## 2022-11-21 RX ORDER — IBUPROFEN 600 MG/1
600 TABLET ORAL EVERY 6 HOURS PRN
Qty: 90 TABLET | Refills: 0 | Status: SHIPPED | OUTPATIENT
Start: 2022-11-21 | End: 2022-11-21 | Stop reason: SDUPTHER

## 2022-11-21 RX ORDER — SULFAMETHOXAZOLE AND TRIMETHOPRIM 800; 160 MG/1; MG/1
1 TABLET ORAL 2 TIMES DAILY
Qty: 10 TABLET | Refills: 0 | Status: SHIPPED | OUTPATIENT
Start: 2022-11-21 | End: 2022-11-26

## 2022-11-21 RX ADMIN — SULFAMETHOXAZOLE AND TRIMETHOPRIM 1 TABLET: 800; 160 TABLET ORAL at 19:53

## 2022-11-21 RX ADMIN — IBUPROFEN 600 MG: 600 TABLET, FILM COATED ORAL at 19:53

## 2022-11-21 ASSESSMENT — ENCOUNTER SYMPTOMS
FEVER: 0
WOUND: 0

## 2022-11-22 NOTE — ED PROVIDER NOTES
Emergency Medicine Note  HPI   HISTORY OF PRESENT ILLNESS     27-year-old female presents to the ER with pain in her right axilla over the last 2 days which is moderate to severe associated with swelling no redness or active discharge from the area.  She has apparently had a history of this in the past and has had to have drainage of pus she states.  There is no associated fever.  The pain is severe and constant in the right armpit worse to move and touch.            Patient History   PAST HISTORY     Reviewed from Nursing Triage:       Past Medical History:   Diagnosis Date    Asthma        No past surgical history on file.    No family history on file.    Social History     Tobacco Use    Smoking status: Never    Smokeless tobacco: Never   Substance Use Topics    Alcohol use: No    Drug use: Yes     Types: Marijuana         Review of Systems   REVIEW OF SYSTEMS     Review of Systems   Constitutional: Negative for fever.   Skin: Negative for rash and wound.         VITALS     ED Vitals    Date/Time Temp Pulse Resp BP SpO2 [facility]   11/21/22 1904 -- 75 17 109/74 100 % MTM   11/21/22 1659 36.2 °C (97.2 °F) 108 18 131/70 98 % KLM                       Physical Exam   PHYSICAL EXAM     Physical Exam  Constitutional:       General: She is in acute distress.   Musculoskeletal:      Comments: Right axilla with approximately 2 to 3 cm wide circular swelling without redness or active discharge.  His skin looks grossly intact without induration.   Neurological:      Mental Status: She is alert.           PROCEDURES     Incision and Drainage    Date/Time: 11/21/2022 7:50 PM  Performed by: Willy Wells DO  Authorized by: Willy Wells DO     Consent:     Consent obtained:  Verbal    Consent given by:  Patient  Universal protocol:     Patient identity confirmed:  Verbally with patient  Location:     Type:  Abscess    Location:  Upper extremity  Pre-procedure details:     Skin preparation:   Povidone-iodine  Anesthesia:     Anesthesia method:  Local infiltration    Local anesthetic:  Lidocaine 1% WITH epi  Procedure type:     Complexity:  Simple  Procedure details:     Ultrasound guidance: no      Incision types:  Stab incision    Incision depth:  Dermal    Drainage:  Purulent    Drainage amount:  Moderate    Wound treatment:  Drain placed    Packing materials:  1/4 in gauze  Post-procedure details:     Procedure completion:  Tolerated         DATA     Results     None          Imaging Results    None         No orders to display       Scoring tools                                  ED Course & MDM   MDM / ED COURSE / CLINICAL IMPRESSION / DISPO     MDM  Number of Diagnoses or Management Options  Diagnosis management comments: 27-year-old female presents with right axilla pain and swelling.  Differential diagnosis includes abscess, sebaceous cyst, doubt lymphadenopathy.  We will need to numb the area and try to drain.  She will likely go home on antibiotics.    Risk of Complications, Morbidity, and/or Mortality  Presenting problems: moderate  Diagnostic procedures: moderate  Management options: moderate        ED Course as of 11/21/22 1954 Mon Nov 21, 2022 1954 Incision and drainage by me see procedure note.  Patient not pregnant.  We will get Bactrim for 5 days twice daily along with Motrin 600 mg as needed for pain.  The nurse will dress the wound and taped in her axilla for tonight she can remove this tomorrow and the packing in 2 days. [SC]      ED Course User Index  [SC] Willy Wells DO     Clinical Impression      Cutaneous abscess of right axilla               Willy Wells,   11/21/22 1954

## 2023-05-04 ENCOUNTER — APPOINTMENT (EMERGENCY)
Dept: RADIOLOGY | Facility: HOSPITAL | Age: 28
End: 2023-05-04
Payer: COMMERCIAL

## 2023-05-04 ENCOUNTER — HOSPITAL ENCOUNTER (EMERGENCY)
Facility: HOSPITAL | Age: 28
Discharge: HOME | End: 2023-05-04
Attending: EMERGENCY MEDICINE
Payer: COMMERCIAL

## 2023-05-04 VITALS
BODY MASS INDEX: 26.58 KG/M2 | TEMPERATURE: 99 F | HEART RATE: 76 BPM | HEIGHT: 63 IN | SYSTOLIC BLOOD PRESSURE: 105 MMHG | RESPIRATION RATE: 17 BRPM | OXYGEN SATURATION: 100 % | WEIGHT: 150 LBS | DIASTOLIC BLOOD PRESSURE: 58 MMHG

## 2023-05-04 DIAGNOSIS — R11.2 NAUSEA AND VOMITING, UNSPECIFIED VOMITING TYPE: Primary | ICD-10-CM

## 2023-05-04 DIAGNOSIS — B34.9 VIRAL SYNDROME: ICD-10-CM

## 2023-05-04 LAB
ALBUMIN SERPL-MCNC: 4.2 G/DL (ref 3.4–5)
ALP SERPL-CCNC: 40 IU/L (ref 35–126)
ALT SERPL-CCNC: 21 IU/L (ref 11–54)
ANION GAP SERPL CALC-SCNC: 11 MEQ/L (ref 3–15)
AST SERPL-CCNC: 21 IU/L (ref 15–41)
B-HCG UR QL: POSITIVE
BACTERIA URNS QL MICRO: ABNORMAL /HPF
BASOPHILS # BLD: 0.08 K/UL (ref 0.01–0.1)
BASOPHILS NFR BLD: 0.9 %
BILIRUB SERPL-MCNC: 1.1 MG/DL (ref 0.3–1.2)
BILIRUB UR QL STRIP.AUTO: NEGATIVE MG/DL
BUN SERPL-MCNC: 10 MG/DL (ref 8–20)
CALCIUM SERPL-MCNC: 9.8 MG/DL (ref 8.9–10.3)
CHLORIDE SERPL-SCNC: 104 MEQ/L (ref 98–109)
CLARITY UR REFRACT.AUTO: CLEAR
CO2 SERPL-SCNC: 23 MEQ/L (ref 22–32)
COLOR UR AUTO: YELLOW
CREAT SERPL-MCNC: 0.7 MG/DL (ref 0.6–1.1)
DIFFERENTIAL METHOD BLD: ABNORMAL
EOSINOPHIL # BLD: 0.02 K/UL (ref 0.04–0.36)
EOSINOPHIL NFR BLD: 0.2 %
ERYTHROCYTE [DISTWIDTH] IN BLOOD BY AUTOMATED COUNT: 12.5 % (ref 11.7–14.4)
FLUAV RNA SPEC QL NAA+PROBE: NEGATIVE
FLUBV RNA SPEC QL NAA+PROBE: NEGATIVE
GFR SERPL CREATININE-BSD FRML MDRD: >60 ML/MIN/1.73M*2
GLUCOSE SERPL-MCNC: 67 MG/DL (ref 70–99)
GLUCOSE UR STRIP.AUTO-MCNC: NEGATIVE MG/DL
HCG SERPL-ACNC: ABNORMAL IU/L (MIU/ML)
HCT VFR BLDCO AUTO: 41.2 % (ref 35–45)
HGB BLD-MCNC: 13.7 G/DL (ref 11.8–15.7)
HGB UR QL STRIP.AUTO: NEGATIVE
HYALINE CASTS #/AREA URNS LPF: ABNORMAL /LPF
IMM GRANULOCYTES # BLD AUTO: 0.03 K/UL (ref 0–0.08)
IMM GRANULOCYTES NFR BLD AUTO: 0.3 %
KETONES UR STRIP.AUTO-MCNC: 4 MG/DL
LEUKOCYTE ESTERASE UR QL STRIP.AUTO: NEGATIVE
LYMPHOCYTES # BLD: 1.86 K/UL (ref 1.2–3.5)
LYMPHOCYTES NFR BLD: 20.8 %
MCH RBC QN AUTO: 30.2 PG (ref 28–33.2)
MCHC RBC AUTO-ENTMCNC: 33.3 G/DL (ref 32.2–35.5)
MCV RBC AUTO: 90.9 FL (ref 83–98)
MONOCYTES # BLD: 0.6 K/UL (ref 0.28–0.8)
MONOCYTES NFR BLD: 6.7 %
MUCOUS THREADS URNS QL MICRO: 3 /LPF
NEUTROPHILS # BLD: 6.37 K/UL (ref 1.7–7)
NEUTS SEG NFR BLD: 71.1 %
NITRITE UR QL STRIP.AUTO: NEGATIVE
NRBC BLD-RTO: 0 %
PDW BLD AUTO: 9.2 FL (ref 9.4–12.3)
PH UR STRIP.AUTO: 6 [PH]
PLATELET # BLD AUTO: 275 K/UL (ref 150–369)
POCT TEST: ABNORMAL
POTASSIUM SERPL-SCNC: 3.6 MEQ/L (ref 3.6–5.1)
PROT SERPL-MCNC: 7.4 G/DL (ref 6–8.2)
PROT UR QL STRIP.AUTO: 2
RBC # BLD AUTO: 4.53 M/UL (ref 3.93–5.22)
RBC #/AREA URNS HPF: ABNORMAL /HPF
RSV RNA SPEC QL NAA+PROBE: NEGATIVE
SARS-COV-2 RNA RESP QL NAA+PROBE: NEGATIVE
SODIUM SERPL-SCNC: 138 MEQ/L (ref 136–144)
SP GR UR REFRACT.AUTO: >1.035
SQUAMOUS URNS QL MICRO: 1 /HPF
UROBILINOGEN UR STRIP-ACNC: 0.2 EU/DL
WBC # BLD AUTO: 8.96 K/UL (ref 3.8–10.5)
WBC #/AREA URNS HPF: ABNORMAL /HPF

## 2023-05-04 PROCEDURE — 25800000 HC PHARMACY IV SOLUTIONS: Performed by: PHYSICIAN ASSISTANT

## 2023-05-04 PROCEDURE — 87086 URINE CULTURE/COLONY COUNT: CPT | Performed by: EMERGENCY MEDICINE

## 2023-05-04 PROCEDURE — 76815 OB US LIMITED FETUS(S): CPT

## 2023-05-04 PROCEDURE — 96360 HYDRATION IV INFUSION INIT: CPT

## 2023-05-04 PROCEDURE — 81003 URINALYSIS AUTO W/O SCOPE: CPT | Performed by: EMERGENCY MEDICINE

## 2023-05-04 PROCEDURE — 80053 COMPREHEN METABOLIC PANEL: CPT | Performed by: EMERGENCY MEDICINE

## 2023-05-04 PROCEDURE — 3E0337Z INTRODUCTION OF ELECTROLYTIC AND WATER BALANCE SUBSTANCE INTO PERIPHERAL VEIN, PERCUTANEOUS APPROACH: ICD-10-PCS | Performed by: EMERGENCY MEDICINE

## 2023-05-04 PROCEDURE — 76817 TRANSVAGINAL US OBSTETRIC: CPT

## 2023-05-04 PROCEDURE — 99284 EMERGENCY DEPT VISIT MOD MDM: CPT | Mod: 25

## 2023-05-04 PROCEDURE — 93975 VASCULAR STUDY: CPT

## 2023-05-04 PROCEDURE — 84702 CHORIONIC GONADOTROPIN TEST: CPT | Performed by: PHYSICIAN ASSISTANT

## 2023-05-04 PROCEDURE — 36415 COLL VENOUS BLD VENIPUNCTURE: CPT | Performed by: EMERGENCY MEDICINE

## 2023-05-04 PROCEDURE — 85025 COMPLETE CBC W/AUTO DIFF WBC: CPT | Performed by: EMERGENCY MEDICINE

## 2023-05-04 PROCEDURE — 63700000 HC SELF-ADMINISTRABLE DRUG: Performed by: PHYSICIAN ASSISTANT

## 2023-05-04 PROCEDURE — 63600000 HC DRUGS/DETAIL CODE: Performed by: EMERGENCY MEDICINE

## 2023-05-04 PROCEDURE — 96361 HYDRATE IV INFUSION ADD-ON: CPT

## 2023-05-04 PROCEDURE — 87637 SARSCOV2&INF A&B&RSV AMP PRB: CPT | Performed by: EMERGENCY MEDICINE

## 2023-05-04 RX ORDER — PYRIDOXINE HCL (VITAMIN B6) 25 MG
25 TABLET ORAL EVERY 8 HOURS PRN
Qty: 21 TABLET | Refills: 0 | Status: SHIPPED | OUTPATIENT
Start: 2023-05-04 | End: 2023-05-04 | Stop reason: SDUPTHER

## 2023-05-04 RX ORDER — ONDANSETRON HYDROCHLORIDE 2 MG/ML
4 INJECTION, SOLUTION INTRAVENOUS ONCE
Status: DISCONTINUED | OUTPATIENT
Start: 2023-05-04 | End: 2023-05-04

## 2023-05-04 RX ORDER — LANOLIN ALCOHOL/MO/W.PET/CERES
25 CREAM (GRAM) TOPICAL DAILY
Status: DISCONTINUED | OUTPATIENT
Start: 2023-05-04 | End: 2023-05-04 | Stop reason: HOSPADM

## 2023-05-04 RX ORDER — PYRIDOXINE HCL (VITAMIN B6) 25 MG
25 TABLET ORAL EVERY 8 HOURS PRN
Qty: 9 TABLET | Refills: 0 | Status: SHIPPED | OUTPATIENT
Start: 2023-05-04 | End: 2023-05-07

## 2023-05-04 RX ADMIN — PYRIDOXINE HCL TAB 50 MG 25 MG: 50 TAB at 17:03

## 2023-05-04 RX ADMIN — SODIUM CHLORIDE, POTASSIUM CHLORIDE, SODIUM LACTATE AND CALCIUM CHLORIDE 1000 ML: 600; 310; 30; 20 INJECTION, SOLUTION INTRAVENOUS at 14:51

## 2023-05-04 RX ADMIN — SODIUM CHLORIDE 1000 ML: 9 INJECTION, SOLUTION INTRAVENOUS at 12:13

## 2023-05-04 NOTE — ED ATTESTATION NOTE
I have personally seen and examined Irene Wyman.  I was involved in the care and medical decision making for this patient.    I reviewed and agree with physician assistant / nurse practitioner’s assessment and plan of care; any exceptions are documented below.      My focused history, examination, assessment and plan of care of Irene Wyman is as follows:    Brief History:  HPI  28-year-old female no past medical history who presents with nausea vomiting and body aches that started a few days ago.  She has not been able to keep anything down she had a low-grade temperature at home.  She denies any cough, shortness of breath, trouble urinating, diarrhea or vaginal bleeding.  She does have some bilateral lower abdominal pain.  Her pain is located on the left.      Focused Physical Exam:  Physical Exam  Awake alert and oriented x3, no acute distress  Lungs: Clear to auscultation bilaterally  Abdomen: Soft, mild left lower quadrant tenderness, no right lower quadrant tenderness no rebound or guarding      Assessment / Plan / MDM:  MDM  28-year-old female presents with nausea vomiting abdominal pain discovered to be pregnant here patient was unaware  Labs  Fluids  UA  Ultrasound             Linda Torres MD  05/05/23 3535

## 2023-05-04 NOTE — DISCHARGE INSTRUCTIONS
Please follow up as directed to obtain your results and review your plan of care. CALL your primary doctor's office today to schedule a follow up appointment within the next 48 hours.     REVIEW AND DISCUSS ALL OF YOUR MEDICATIONS WITH YOUR PRIMARY CARE TEAM WITHIN THE NEXT 2 DAYS, even ones that you may have been on for a long time.    Radiology review of your studies (XRAYs, CT Scans, Ultrasounds, MRI scans) may still be pending. Preliminary results may be available today but final written reports may not be available until tomorrow. Important findings may be included in the final radiology review.     Please CALL the Emergency Department at [phone] to obtain the final results within the next 24 hours. Review the final results of all of your tests with your primary care doctor within the next 2 days.     Cultures and uncommon labs may take 2 days or more before results are available. Please ask about all pending tests until the final results are known. You may not be notified of important test results unless you ask for these when you call or when you follow up.    Please call or visit your primary doctor or return to this Emergency Department (or the closest Emergency Department) if you have new concerns, if you are not getting better, or if  you feel that you are getting worse.    Thank you and good luck in your recovery!

## 2023-05-04 NOTE — ED PROVIDER NOTES
Emergency Medicine Note  HPI   HISTORY OF PRESENT ILLNESS     Prior records reviewed from [facility]. History obtained by the patient.    Ms. Wyman is a 28 y.o. female with no significant PMH  presents to the ER with a chief complaint of body aches x 5 days.  Patient admits to spontaneous onset gradual worsening of generalized body aches as well as persistent nausea and vomiting.  Patient states that she has been unable to hold down any solids or liquids.  Patient also admits to fever of 100 °F over the past 3 days despite the use of Tylenol.  Patient states that over the past 2 days she has developed moderate pain concentrated in her left pelvic region.  Patient denies any vaginal bleeding, diarrhea, chest pain, shortness of breath, cough, headache, lightheadedness, dizziness or loss of consciousness.    Patient states that the date of her last menstrual period was at the end of March (3/21/23).              Patient History   PAST HISTORY     Reviewed from Nursing Triage:       Past Medical History:   Diagnosis Date   • Asthma        History reviewed. No pertinent surgical history.    History reviewed. No pertinent family history.    Social History     Tobacco Use   • Smoking status: Never   • Smokeless tobacco: Never   Substance Use Topics   • Alcohol use: No   • Drug use: Yes     Types: Marijuana         Review of Systems   REVIEW OF SYSTEMS     Review of Systems   Constitutional: Positive for fatigue and fever. Negative for activity change.   Respiratory: Negative for cough and shortness of breath.    Cardiovascular: Negative for chest pain and leg swelling.   Gastrointestinal: Positive for nausea and vomiting. Negative for abdominal pain and diarrhea.   Musculoskeletal: Negative for back pain and neck pain.   Skin: Negative for color change.   Neurological: Negative for seizures, syncope, speech difficulty, weakness and headaches.   Psychiatric/Behavioral: Negative for agitation and behavioral problems.          VITALS     ED Vitals    Date/Time Temp Pulse Resp BP SpO2 [facility]   05/04/23 1700 -- 76 17 105/58 100 % MS   05/04/23 1535 -- 82 16 105/55 99 % DNG   05/04/23 1300 -- 84 16 103/55 100 % DNG   05/04/23 1214 -- 72 16 123/60 100 % DNG   05/04/23 1115 37.2 °C (99 °F) 83 16 118/56 99 % PZ                       Physical Exam   PHYSICAL EXAM     Physical Exam  Vitals and nursing note reviewed.   Constitutional:       Appearance: She is well-developed.   HENT:      Head: Normocephalic and atraumatic.   Eyes:      Conjunctiva/sclera: Conjunctivae normal.   Cardiovascular:      Rate and Rhythm: Normal rate and regular rhythm.   Pulmonary:      Effort: Pulmonary effort is normal.      Breath sounds: Normal breath sounds.   Abdominal:      General: There is no distension.      Palpations: Abdomen is soft. There is no mass.      Tenderness: There is no abdominal tenderness.      Hernia: No hernia is present.   Musculoskeletal:         General: No tenderness or deformity. Normal range of motion.      Cervical back: Normal range of motion.   Skin:     General: Skin is warm and dry.   Neurological:      Mental Status: She is alert. Mental status is at baseline.   Psychiatric:         Behavior: Behavior normal.           PROCEDURES     Procedures     DATA     Results     Procedure Component Value Units Date/Time    BhCG, Serum, Quant [[id number]]  (Abnormal) Collected: 05/04/23 1211    Specimen: Blood, Venous Updated: 05/04/23 1516     hCG Quant 49,627.0 IU/L (mIU/mL)      Comment: Approx. Gestational Age   Approx. hCG Range         (Weeks)                  (IU/L)          0.2-1                    5-50            1-2                               2-3                  100-5000            3-4                  500-10,000            4-5                 1000-50,000            5-6               10,000-100,000            6-8               15,000-200,000            8-12              10,000-100,000       Urinalysis with Reflex Culture  [[id number]]  (Abnormal) Collected: 05/04/23 1320    Specimen: Urine, Clean Catch Updated: 05/04/23 1352    Narrative:      The following orders were created for panel order Urinalysis with Reflex Culture.  Procedure                               Abnormality         Status                     ---------                               -----------         ------                     UA Reflex to Culture (Ma...[[id number]]  Abnormal            Final result               UA Microscopic[[id number]]               Abnormal            Final result                 Please view results for these tests on the individual orders.    UA Microscopic [[id number]]  (Abnormal) Collected: 05/04/23 1320    Specimen: Urine, Clean Catch Updated: 05/04/23 1352     RBC, Urine 0 TO 4 /HPF      WBC, Urine 4 TO 10 /HPF      Squamous Epithelial +1 /hpf      Hyaline Cast None Seen /lpf      Bacteria, Urine Rare /HPF      Mucus +3 /LPF     UA Reflex to Culture (Macroscopic) [[id number]]  (Abnormal) Collected: 05/04/23 1320    Specimen: Urine, Clean Catch Updated: 05/04/23 1351     Color, Urine Yellow     Clarity, Urine Clear     Specific Gravity, Urine >1.035     pH, Urine 6.0     Leukocyte Esterase Negative     Comment: Results can be falsely negative due to high specific gravity, some antibiotics, glucose >3 g/dl, or WBC other than neutrophils.        Nitrite, Urine Negative     Protein, Urine +2     Glucose, Urine Negative mg/dL      Ketones, Urine +4 mg/dL      Comment: Free sulfhydryl drugs such as Mesna, Capoten, and Acetylcysteine (Mucomyst) may cause false positive ketonuria.        Urobilinogen, Urine 0.2 EU/dL      Bilirubin, Urine Negative mg/dL      Blood, Urine Negative     Comment: The sensitivity of the occult blood test is equivalent to approximately 4 intact RBC/HPF.       SARS-CoV-2 (COVID-19), PCR Nasopharynx [[id number]]  (Normal) Collected: 05/04/23 1212    Specimen: Nasopharyngeal Swab from Nasopharynx Updated: 05/04/23 1349     Narrative:      The following orders were created for panel order SARS-CoV-2 (COVID-19), PCR Nasopharynx.  Procedure                               Abnormality         Status                     ---------                               -----------         ------                     SARS-COV-2 (COVID-19)/ F...[[id number]]  Normal              Final result                 Please view results for these tests on the individual orders.    SARS-COV-2 (COVID-19)/ FLU A/B, AND RSV, PCR Nasopharynx [[id number]]  (Normal) Collected: 05/04/23 1212    Specimen: Nasopharyngeal Swab from Nasopharynx Updated: 05/04/23 1343     SARS-CoV-2 (COVID-19) Negative     Influenza A Negative     Influenza B Negative     Respiratory Syncytial Virus Negative    Narrative:      Testing performed using real-time PCR for detection of COVID-19. EUA approved validation studies performed on site.     Comprehensive metabolic panel [[id number]]  (Abnormal) Collected: 05/04/23 1211    Specimen: Blood, Venous Updated: 05/04/23 1319     Sodium 138 mEQ/L      Potassium 3.6 mEQ/L      Comment: Results obtained on plasma. Plasma Potassium values may be up to 0.4 mEQ/L less than serum values. The differences may be greater for patients with high platelet or white cell counts.        Chloride 104 mEQ/L      CO2 23 mEQ/L      BUN 10 mg/dL      Creatinine 0.7 mg/dL      Glucose 67 mg/dL      Calcium 9.8 mg/dL      AST (SGOT) 21 IU/L      ALT (SGPT) 21 IU/L      Alkaline Phosphatase 40 IU/L      Total Protein 7.4 g/dL      Comment: Test performed on plasma which typically contains approximately 0.4 g/dL more protein than serum.        Albumin 4.2 g/dL      Bilirubin, Total 1.1 mg/dL      eGFR >60.0 mL/min/1.73m*2      Anion Gap 11 mEQ/L     CBC and differential [[id number]]  (Abnormal) Collected: 05/04/23 1211    Specimen: Blood, Venous Updated: 05/04/23 1229     WBC 8.96 K/uL      RBC 4.53 M/uL      Hemoglobin 13.7 g/dL      Hematocrit 41.2 %      MCV 90.9 fL       MCH 30.2 pg      MCHC 33.3 g/dL      RDW 12.5 %      Platelets 275 K/uL      MPV 9.2 fL      Differential Type Auto     nRBC 0.0 %      Immature Granulocytes 0.3 %      Neutrophils 71.1 %      Lymphocytes 20.8 %      Monocytes 6.7 %      Eosinophils 0.2 %      Basophils 0.9 %      Immature Granulocytes, Absolute 0.03 K/uL      Neutrophils, Absolute 6.37 K/uL      Lymphocytes, Absolute 1.86 K/uL      Monocytes, Absolute 0.60 K/uL      Eosinophils, Absolute 0.02 K/uL      Basophils, Absolute 0.08 K/uL           Imaging Results          ULTRASOUND DOPPLER (Final result)  Result time 05/04/23 14:57:34    Final result                 Impression:    IMPRESSION:  1.  Single live intrauterine gestation with embryo demonstrating an assessment  gestational age of 6 weeks 2 days by crown-rump length. Embryonic cardiac  activity is identified although with heart rate only measuring 111 bpm which is  nonspecific. Short-term ultrasound follow-up is recommended perhaps in one week  or when clinically needed.  2.  Small subchorionic hemorrhage causing no mass effect on the gestational sac.  3.  No evidence of ovarian torsion at this time.    COMMENT:    Comparison: Ultrasound of the pelvis dated 3/7/2017  TECHNIQUE:  A limited transabdominal pelvis and transvaginal pelvis examination is  performed.  Transabdominal ultrasound was performed to allow a broad examination  of the pelvis to visualize structures that cannot be seen with transvaginal  imaging alone. Transvaginal scans were performed for better delineation of the  pelvic structures, particularly the endometrium and adnexa.  Spectral Doppler evaluation of the ovaries was performed to evaluate for  torsion.  ---    FINDINGS:  Uterus:  The anteverted uterus measures 9.3 x 5.2 x 6.7 cm on transabdominal  images. Myometrium is within normal limits.    Endometrium: Within the endometrium, there is a single regular shaped  gestational sac with average sac diameter of 1.9 cm.  Within the gestational sac  there is a yolk sac. There is also a single small embryo which demonstrate an  average crown-rump length of 0.53 cm corresponding to an estimated gestational  age of 6 weeks 2 days (range 6 weeks 0 days-6 weeks 4 days). This is concordant  with average gestational sac size. There is a small subchorionic hemorrhage  noted along the right lateral aspect of the gestational sac which does not cause  mass effect on the sac itself.  There is embryonic cardiac activity with heart rate measuring 111 bpm which is  slightly slow but nonspecific.    Right ovary: Right ovary measures 2.5 x 4.6 x 2.7 cm and contains a 2.6 cm  corpus luteum. Normal arterial and venous waveforms are documented in the right  ovary which demonstrate satisfactory parenchymal echogenicity    Left ovary: Left ovary measures 3 x 2 x 2.4 cm with small follicles and normal  echotexture/echogenicity. Normal arterial and venous waveforms are documented.    Free fluid: Possible physiologic    Comments: Structures better delineated on transvaginal imaging               Narrative:    CLINICAL HISTORY: pt reports abd. pain w/ vomiting since Sat, LMP 23.  Positive urine pregnancy test.  .                               ULTRASOUND PREGNANCY TRANSVAGINAL ONLY (Final result)  Result time 23 14:57:34   Procedure changed from ULTRASOUND PELVIS TRANSVAGINAL ONLY     Final result                 Impression:    IMPRESSION:  1.  Single live intrauterine gestation with embryo demonstrating an assessment  gestational age of 6 weeks 2 days by crown-rump length. Embryonic cardiac  activity is identified although with heart rate only measuring 111 bpm which is  nonspecific. Short-term ultrasound follow-up is recommended perhaps in one week  or when clinically needed.  2.  Small subchorionic hemorrhage causing no mass effect on the gestational sac.  3.  No evidence of ovarian torsion at this time.    COMMENT:    Comparison:  Ultrasound of the pelvis dated 3/7/2017  TECHNIQUE:  A limited transabdominal pelvis and transvaginal pelvis examination is  performed.  Transabdominal ultrasound was performed to allow a broad examination  of the pelvis to visualize structures that cannot be seen with transvaginal  imaging alone. Transvaginal scans were performed for better delineation of the  pelvic structures, particularly the endometrium and adnexa.  Spectral Doppler evaluation of the ovaries was performed to evaluate for  torsion.  ---    FINDINGS:  Uterus:  The anteverted uterus measures 9.3 x 5.2 x 6.7 cm on transabdominal  images. Myometrium is within normal limits.    Endometrium: Within the endometrium, there is a single regular shaped  gestational sac with average sac diameter of 1.9 cm. Within the gestational sac  there is a yolk sac. There is also a single small embryo which demonstrate an  average crown-rump length of 0.53 cm corresponding to an estimated gestational  age of 6 weeks 2 days (range 6 weeks 0 days-6 weeks 4 days). This is concordant  with average gestational sac size. There is a small subchorionic hemorrhage  noted along the right lateral aspect of the gestational sac which does not cause  mass effect on the sac itself.  There is embryonic cardiac activity with heart rate measuring 111 bpm which is  slightly slow but nonspecific.    Right ovary: Right ovary measures 2.5 x 4.6 x 2.7 cm and contains a 2.6 cm  corpus luteum. Normal arterial and venous waveforms are documented in the right  ovary which demonstrate satisfactory parenchymal echogenicity    Left ovary: Left ovary measures 3 x 2 x 2.4 cm with small follicles and normal  echotexture/echogenicity. Normal arterial and venous waveforms are documented.    Free fluid: Possible physiologic    Comments: Structures better delineated on transvaginal imaging               Narrative:    CLINICAL HISTORY: pt reports abd. pain w/ vomiting since Sat, LMP 03/21/23.  Positive  urine pregnancy test.  .                               ULTRASOUND PREGNANCY < 14 WEEKS TRANSABDOMINAL LIMITED (Final result)  Result time 23 14:57:34   Procedure changed from ULTRASOUND PELVIS LIMITED TRANSABDOMINAL ONLY     Final result                 Impression:    IMPRESSION:  1.  Single live intrauterine gestation with embryo demonstrating an assessment  gestational age of 6 weeks 2 days by crown-rump length. Embryonic cardiac  activity is identified although with heart rate only measuring 111 bpm which is  nonspecific. Short-term ultrasound follow-up is recommended perhaps in one week  or when clinically needed.  2.  Small subchorionic hemorrhage causing no mass effect on the gestational sac.  3.  No evidence of ovarian torsion at this time.    COMMENT:    Comparison: Ultrasound of the pelvis dated 3/7/2017  TECHNIQUE:  A limited transabdominal pelvis and transvaginal pelvis examination is  performed.  Transabdominal ultrasound was performed to allow a broad examination  of the pelvis to visualize structures that cannot be seen with transvaginal  imaging alone. Transvaginal scans were performed for better delineation of the  pelvic structures, particularly the endometrium and adnexa.  Spectral Doppler evaluation of the ovaries was performed to evaluate for  torsion.  ---    FINDINGS:  Uterus:  The anteverted uterus measures 9.3 x 5.2 x 6.7 cm on transabdominal  images. Myometrium is within normal limits.    Endometrium: Within the endometrium, there is a single regular shaped  gestational sac with average sac diameter of 1.9 cm. Within the gestational sac  there is a yolk sac. There is also a single small embryo which demonstrate an  average crown-rump length of 0.53 cm corresponding to an estimated gestational  age of 6 weeks 2 days (range 6 weeks 0 days-6 weeks 4 days). This is concordant  with average gestational sac size. There is a small subchorionic hemorrhage  noted along the right lateral  "aspect of the gestational sac which does not cause  mass effect on the sac itself.  There is embryonic cardiac activity with heart rate measuring 111 bpm which is  slightly slow but nonspecific.    Right ovary: Right ovary measures 2.5 x 4.6 x 2.7 cm and contains a 2.6 cm  corpus luteum. Normal arterial and venous waveforms are documented in the right  ovary which demonstrate satisfactory parenchymal echogenicity    Left ovary: Left ovary measures 3 x 2 x 2.4 cm with small follicles and normal  echotexture/echogenicity. Normal arterial and venous waveforms are documented.    Free fluid: Possible physiologic    Comments: Structures better delineated on transvaginal imaging               Narrative:    CLINICAL HISTORY: pt reports abd. pain w/ vomiting since Sat, LMP 23.  Positive urine pregnancy test.  .                                No orders to display       Scoring tools                                  ED Course & MDM   MDM / ED COURSE / CLINICAL IMPRESSION / DISPO     Medical Decision Making  Nausea and vomiting, unspecified vomiting type: acute illness or injury  Viral syndrome: acute illness or injury  Amount and/or Complexity of Data Reviewed  Labs: ordered.  Radiology: ordered.      Risk  OTC drugs.          ED Course as of 23 1030   Thu May 04, 2023   1324 End of march LMP.  Patient states that since Saturday can not eat anything and states that \"things taste nasty\".  Patient also states that she has been experiencing fatigue and body shakes along with a fever of 100 degrees Fahrenheit [BC]   1331 2 days ago development of left LLQ/pelvic pain.   [BC]   1610 Patient informed that her pregnancy test came back positive.  Patient was not aware.  Spoke to OB who stated that the patient needs to call in order to establish an appointment.  Informed patient and patient verbalized understanding. [BC]   1705 Attempting another p.o. challenge visualized patient eating soup and crackers with no signs " of emesis.  Patient now provided with applesauce and will see if she can hold this down.  Patient will be stable for discharge.  Informed patient to continue use of supportive treatment. [BC]      ED Course User Index  [BC] Mckinley Rawls PA C     Clinical Impression      Nausea and vomiting, unspecified vomiting type   Viral syndrome     _________________     ED Disposition   Discharge                   Mckinley Rawls PA C  05/09/23 1031

## 2023-05-05 LAB — BACTERIA UR CULT: NORMAL

## 2023-05-09 ASSESSMENT — ENCOUNTER SYMPTOMS
AGITATION: 0
HEADACHES: 0
FEVER: 1
SEIZURES: 0
WEAKNESS: 0
COLOR CHANGE: 0
VOMITING: 1
FATIGUE: 1
SHORTNESS OF BREATH: 0
ABDOMINAL PAIN: 0
BACK PAIN: 0
DIARRHEA: 0
ACTIVITY CHANGE: 0
SPEECH DIFFICULTY: 0
COUGH: 0
NECK PAIN: 0
NAUSEA: 1

## 2023-12-19 ENCOUNTER — APPOINTMENT (EMERGENCY)
Dept: RADIOLOGY | Facility: HOSPITAL | Age: 28
End: 2023-12-19
Payer: COMMERCIAL

## 2023-12-19 ENCOUNTER — HOSPITAL ENCOUNTER (EMERGENCY)
Facility: HOSPITAL | Age: 28
Discharge: HOME | End: 2023-12-19
Attending: EMERGENCY MEDICINE

## 2023-12-19 VITALS
WEIGHT: 150 LBS | OXYGEN SATURATION: 97 % | RESPIRATION RATE: 18 BRPM | DIASTOLIC BLOOD PRESSURE: 59 MMHG | BODY MASS INDEX: 26.58 KG/M2 | SYSTOLIC BLOOD PRESSURE: 104 MMHG | HEART RATE: 98 BPM | HEIGHT: 63 IN | TEMPERATURE: 99.8 F

## 2023-12-19 DIAGNOSIS — J10.1 INFLUENZA A: Primary | ICD-10-CM

## 2023-12-19 LAB
ALBUMIN SERPL-MCNC: 4.5 G/DL (ref 3.5–5.7)
ALP SERPL-CCNC: 34 IU/L (ref 34–125)
ALT SERPL-CCNC: 10 IU/L (ref 7–52)
ANION GAP SERPL CALC-SCNC: 9 MEQ/L (ref 3–15)
AST SERPL-CCNC: 14 IU/L (ref 13–39)
ATRIAL RATE: 112
BASOPHILS # BLD: 0.04 K/UL (ref 0.01–0.1)
BASOPHILS NFR BLD: 0.6 %
BILIRUB SERPL-MCNC: 0.5 MG/DL (ref 0.3–1.2)
BUN SERPL-MCNC: 8 MG/DL (ref 7–25)
CALCIUM SERPL-MCNC: 9.4 MG/DL (ref 8.6–10.3)
CHLORIDE SERPL-SCNC: 105 MEQ/L (ref 98–107)
CO2 SERPL-SCNC: 25 MEQ/L (ref 21–31)
CREAT SERPL-MCNC: 0.6 MG/DL (ref 0.6–1.2)
DIFFERENTIAL METHOD BLD: ABNORMAL
EGFRCR SERPLBLD CKD-EPI 2021: >60 ML/MIN/1.73M*2
EOSINOPHIL # BLD: 0.01 K/UL (ref 0.04–0.36)
EOSINOPHIL NFR BLD: 0.2 %
ERYTHROCYTE [DISTWIDTH] IN BLOOD BY AUTOMATED COUNT: 13.1 % (ref 11.7–14.4)
FLUAV RNA SPEC QL NAA+PROBE: POSITIVE
FLUBV RNA SPEC QL NAA+PROBE: NEGATIVE
GLUCOSE SERPL-MCNC: 104 MG/DL (ref 70–99)
HCG UR QL: NEGATIVE
HCT VFR BLDCO AUTO: 36.6 % (ref 35–45)
HGB BLD-MCNC: 12.4 G/DL (ref 11.8–15.7)
IMM GRANULOCYTES # BLD AUTO: 0.01 K/UL (ref 0–0.08)
IMM GRANULOCYTES NFR BLD AUTO: 0.2 %
LYMPHOCYTES # BLD: 0.33 K/UL (ref 1.2–3.5)
LYMPHOCYTES NFR BLD: 5 %
MCH RBC QN AUTO: 30 PG (ref 28–33.2)
MCHC RBC AUTO-ENTMCNC: 33.9 G/DL (ref 32.2–35.5)
MCV RBC AUTO: 88.6 FL (ref 83–98)
MONOCYTES # BLD: 0.56 K/UL (ref 0.28–0.8)
MONOCYTES NFR BLD: 8.4 %
NEUTROPHILS # BLD: 5.69 K/UL (ref 1.7–7)
NEUTS SEG NFR BLD: 85.6 %
NRBC BLD-RTO: 0 %
P AXIS: -10
PDW BLD AUTO: 10.1 FL (ref 9.4–12.3)
PLATELET # BLD AUTO: 241 K/UL (ref 150–369)
POTASSIUM SERPL-SCNC: 3.5 MEQ/L (ref 3.5–5.1)
PR INTERVAL: 130
PROT SERPL-MCNC: 7.6 G/DL (ref 6–8.2)
QRS DURATION: 78
QT INTERVAL: 298
QTC CALCULATION(BAZETT): 406
R AXIS: -18
RBC # BLD AUTO: 4.13 M/UL (ref 3.93–5.22)
RSV RNA SPEC QL NAA+PROBE: NEGATIVE
S PYO DNA THROAT QL NAA+PROBE: NOT DETECTED
SARS-COV-2 RNA RESP QL NAA+PROBE: NEGATIVE
SODIUM SERPL-SCNC: 139 MEQ/L (ref 136–145)
T WAVE AXIS: 33
VENTRICULAR RATE: 112
WBC # BLD AUTO: 6.64 K/UL (ref 3.8–10.5)

## 2023-12-19 PROCEDURE — 36415 COLL VENOUS BLD VENIPUNCTURE: CPT

## 2023-12-19 PROCEDURE — 93005 ELECTROCARDIOGRAM TRACING: CPT

## 2023-12-19 PROCEDURE — 93005 ELECTROCARDIOGRAM TRACING: CPT | Performed by: EMERGENCY MEDICINE

## 2023-12-19 PROCEDURE — 96372 THER/PROPH/DIAG INJ SC/IM: CPT

## 2023-12-19 PROCEDURE — 85025 COMPLETE CBC W/AUTO DIFF WBC: CPT

## 2023-12-19 PROCEDURE — 87651 STREP A DNA AMP PROBE: CPT

## 2023-12-19 PROCEDURE — 85025 COMPLETE CBC W/AUTO DIFF WBC: CPT | Performed by: EMERGENCY MEDICINE

## 2023-12-19 PROCEDURE — 71046 X-RAY EXAM CHEST 2 VIEWS: CPT

## 2023-12-19 PROCEDURE — 84703 CHORIONIC GONADOTROPIN ASSAY: CPT | Performed by: EMERGENCY MEDICINE

## 2023-12-19 PROCEDURE — 93010 ELECTROCARDIOGRAM REPORT: CPT | Performed by: INTERNAL MEDICINE

## 2023-12-19 PROCEDURE — 99283 EMERGENCY DEPT VISIT LOW MDM: CPT | Mod: 25

## 2023-12-19 PROCEDURE — 63700000 HC SELF-ADMINISTRABLE DRUG

## 2023-12-19 PROCEDURE — 87637 SARSCOV2&INF A&B&RSV AMP PRB: CPT

## 2023-12-19 PROCEDURE — 3E0233Z INTRODUCTION OF ANTI-INFLAMMATORY INTO MUSCLE, PERCUTANEOUS APPROACH: ICD-10-PCS | Performed by: EMERGENCY MEDICINE

## 2023-12-19 PROCEDURE — 87637 SARSCOV2&INF A&B&RSV AMP PRB: CPT | Performed by: EMERGENCY MEDICINE

## 2023-12-19 PROCEDURE — 63600000 HC DRUGS/DETAIL CODE: Mod: JZ

## 2023-12-19 PROCEDURE — 80053 COMPREHEN METABOLIC PANEL: CPT | Performed by: EMERGENCY MEDICINE

## 2023-12-19 PROCEDURE — 80053 COMPREHEN METABOLIC PANEL: CPT

## 2023-12-19 PROCEDURE — 84703 CHORIONIC GONADOTROPIN ASSAY: CPT

## 2023-12-19 RX ORDER — DEXAMETHASONE SODIUM PHOSPHATE 10 MG/ML
10 INJECTION INTRAMUSCULAR; INTRAVENOUS ONCE
Status: COMPLETED | OUTPATIENT
Start: 2023-12-19 | End: 2023-12-19

## 2023-12-19 RX ORDER — ACETAMINOPHEN 325 MG/1
975 TABLET ORAL ONCE
Status: COMPLETED | OUTPATIENT
Start: 2023-12-19 | End: 2023-12-19

## 2023-12-19 RX ORDER — ONDANSETRON 4 MG/1
4 TABLET, ORALLY DISINTEGRATING ORAL EVERY 8 HOURS PRN
Qty: 8 TABLET | Refills: 0 | Status: SHIPPED | OUTPATIENT
Start: 2023-12-19 | End: 2023-12-26

## 2023-12-19 RX ORDER — ONDANSETRON 4 MG/1
4 TABLET, ORALLY DISINTEGRATING ORAL ONCE
Status: COMPLETED | OUTPATIENT
Start: 2023-12-19 | End: 2023-12-19

## 2023-12-19 RX ORDER — KETOROLAC TROMETHAMINE 15 MG/ML
15 INJECTION, SOLUTION INTRAMUSCULAR; INTRAVENOUS ONCE
Status: COMPLETED | OUTPATIENT
Start: 2023-12-19 | End: 2023-12-19

## 2023-12-19 RX ADMIN — ONDANSETRON 4 MG: 4 TABLET, ORALLY DISINTEGRATING ORAL at 15:13

## 2023-12-19 RX ADMIN — KETOROLAC TROMETHAMINE 15 MG: 15 INJECTION, SOLUTION INTRAMUSCULAR; INTRAVENOUS at 15:23

## 2023-12-19 RX ADMIN — ACETAMINOPHEN 975 MG: 325 TABLET ORAL at 15:22

## 2023-12-19 RX ADMIN — DEXAMETHASONE SODIUM PHOSPHATE 10 MG: 10 INJECTION INTRAMUSCULAR; INTRAVENOUS at 15:24

## 2023-12-19 NOTE — ED PROVIDER NOTES
Emergency Medicine Note  HPI   HISTORY OF PRESENT ILLNESS     Irene Wyman is a 28 y.o. female with PMH of asthma who presents to the ED today via POV for evaluation of SOB and upper respiratory symptoms since 0100.  Per patient, she was woken up out of her sleep early this morning with some nonspecific symptoms including increased rhinorrhea, congestion, generalized bodyaches, intermittent chills, nausea, vomiting, diarrhea.  She reports that the congestion made her feel like she could barely breathe therefore she took an inhaler puff without any improvement in her symptoms.  She reports that her nose feels increasingly congested and she is unable to get a good breath then but denies any true shortness of breath where she feels as if she is gasping for air.  States that she additionally feels congested with postnasal drip, a very bad sore throat which makes it hard for her to talk and causes discomfort.  Unknown sick contacts but does report recent travel.  Denies any trauma or injury.. Patient specifically denies any documented fevers, headaches, dizziness, vision changes, otalgia, abdominal pain, hematuria, dysuria, urinary urgency or frequency, constipation, palpitations, chest pain, true dyspnea.        Patient History   PAST HISTORY     Reviewed from Nursing Triage:       Past Medical History:   Diagnosis Date   • Asthma        No past surgical history on file.    No family history on file.    Social History     Tobacco Use   • Smoking status: Never   • Smokeless tobacco: Never   Substance Use Topics   • Alcohol use: No   • Drug use: Yes     Types: Marijuana         Review of Systems   REVIEW OF SYSTEMS     See HPI above.        VITALS     ED Vitals    Date/Time Temp Pulse Resp BP SpO2 [facility]   12/19/23 1610 37.7 °C (99.8 °F) 98 18 104/59 97 %    12/19/23 1455 -- 113 20 109/59 99 % EE   12/19/23 1301 37.2 °C (98.9 °F) 129 16 135/66  99 % EE        Pulse Ox %: 100 % (12/19/23 1611)  Pulse Ox Interpretation:  Normal (12/19/23 1611)  Heart Rate: 96 (12/19/23 1611)  Rhythm Strip Interpretation: Normal Sinus Rhythm (12/19/23 1611)     Physical Exam   PHYSICAL EXAM     PHYSICAL EXAM  GEN: well appearing, well nourished, pleasant female in no acute distress, positioned laying down on the stretcher  HEENT: atraumatic, normocephalic. Non-icteric sclera, conjunctiva is pink. External ear without lesions, masses, or tenderness. Auditory canal without edema, erythema, exudates. TMs are gray, translucent, with notable light reflex; no bulging, erythema, edema, exudates, or effusions noted. Nasal mucosa is erythematous with clear rhinorrhea.  Septum is midline, turbinates with erythema and edema but no bleeding. Oral dentition present and in acceptable condition. No lesions on the tongue; mucosa is pink and soft without swelling, bleeding, or signs of infection. Tonsils are 2+ with erythema, edema, no exudates. Posterior oropharynx is with erythema and postnasal drip  NECK: soft, supple, anterior cervical lymphadenopathy  CV: Mild tachycardia with normal rhythm, no murmurs, rubs, or gallops noted   PULM: normal effort and breathing, no accessory muscle use, CTAB without adventitious breath sounds.  Speaking in full sentences on evaluation.  ABO:NT, ND, normoactive BSx4, no rebound, rigidity, or guarding  MSK: no cyanosis, clubbing, or edema noted. Peripheral pulses equal and symmetric at radial  SKIN: warm, dry, and well perfused, no decreased skin turgor, capillary refills <3 secs  NEURO: Alert, awake, answering questions appropriately   PSYCH: normal mood and affect        PROCEDURES     Procedures     DATA     Results     Procedure Component Value Units Date/Time    Group A Strep by PCR, Throat Throat Swab [[id number]]  (Normal) Collected: 12/19/23 1531    Specimen: Throat Swab Updated: 12/19/23 1643     Strep A PCR, Throat Not Detected    SARS-CoV-2 (COVID-19) Nasopharynx [[id number]]  (Abnormal) Collected: 12/19/23 1307     Specimen: Nasopharyngeal Swab from Nasopharynx Updated: 12/19/23 1444    Narrative:      The following orders were created for panel order SARS-CoV-2 (COVID-19) Nasopharynx.  Procedure                               Abnormality         Status                     ---------                               -----------         ------                     SARS-COV-2 (COVID-19)/ F...[[id number]]  Abnormal            Final result                 Please view results for these tests on the individual orders.    SARS-COV-2 (COVID-19)/ FLU A/B, AND RSV, PCR Nasopharynx [[id number]]  (Abnormal) Collected: 12/19/23 1307    Specimen: Nasopharyngeal Swab from Nasopharynx Updated: 12/19/23 1444     SARS-CoV-2 (COVID-19) Negative     Influenza A Positive     Influenza B Negative     Respiratory Syncytial Virus Negative    Narrative:      Testing performed using real-time PCR for detection of COVID-19. EUA approved validation studies performed on site.     Comprehensive metabolic panel [[id number]]  (Abnormal) Collected: 12/19/23 1331    Specimen: Blood, Venous Updated: 12/19/23 1437     Sodium 139 mEQ/L      Potassium 3.5 mEQ/L      Comment: Results obtained on plasma. Plasma Potassium values may be up to 0.4 mEQ/L less than serum values. The differences may be greater for patients with high platelet or white cell counts.        Chloride 105 mEQ/L      CO2 25 mEQ/L      BUN 8 mg/dL      Creatinine 0.6 mg/dL      Glucose 104 mg/dL      Calcium 9.4 mg/dL      AST (SGOT) 14 IU/L      ALT (SGPT) 10 IU/L      Alkaline Phosphatase 34 IU/L      Total Protein 7.6 g/dL      Comment: Test performed on plasma which typically contains approximately 0.4 g/dL more protein than serum.        Albumin 4.5 g/dL      Bilirubin, Total 0.5 mg/dL      eGFR >60.0 mL/min/1.73m*2      Comment: Calculation based on the Chronic Kidney Disease Epidemiology Collaboration (CKD-EPI) equation refit without adjustment for race.        Anion Gap 9 mEQ/L     BhCG, Serum,  Qual (if menstruating female and no urine) [[id number]]  (Normal) Collected: 12/19/23 1331    Specimen: Blood, Venous Updated: 12/19/23 1419     Preg Test, Serum Negative    CBC and differential [[id number]]  (Abnormal) Collected: 12/19/23 1331    Specimen: Blood, Venous Updated: 12/19/23 1412     WBC 6.64 K/uL      RBC 4.13 M/uL      Hemoglobin 12.4 g/dL      Hematocrit 36.6 %      MCV 88.6 fL      MCH 30.0 pg      MCHC 33.9 g/dL      RDW 13.1 %      Platelets 241 K/uL      MPV 10.1 fL      Differential Type Auto     nRBC 0.0 %      Immature Granulocytes 0.2 %      Neutrophils 85.6 %      Lymphocytes 5.0 %      Monocytes 8.4 %      Eosinophils 0.2 %      Basophils 0.6 %      Immature Granulocytes, Absolute 0.01 K/uL      Neutrophils, Absolute 5.69 K/uL      Lymphocytes, Absolute 0.33 K/uL      Monocytes, Absolute 0.56 K/uL      Eosinophils, Absolute 0.01 K/uL      Basophils, Absolute 0.04 K/uL           Imaging Results          X-RAY CHEST 2 VIEWS (Final result)  Result time 12/19/23 15:21:25    Final result                 Impression:    IMPRESSION:  No radiographic evidence of acute cardiopulmonary disease.               Narrative:      CLINICAL HISTORY: Shortness of breath.    COMMENT:  PA and lateral views of the chest were obtained.    Comparison: 10/27/2020    Cardiac monitoring leads obscure portions of the underlying lungs.  There is no  focal consolidation or pleural effusion identified. The cardiac and mediastinal  silhouettes are unremarkable.  No acute abnormality is identified in the  regional osseous structures.                                ECG 12 lead          Scoring tools                                  ED Course & MDM   MDM / ED COURSE / CLINICAL IMPRESSION / DISPO     Medical Decision Making  28 y.o. female with past medical history as above presenting with ongoing upper respiratory symptoms since early this morning alongside concern for shortness of breath in the setting of asthma  history    Based on history and presentation, differentials include low suspicion asthma exacerbation in the absence of any wheezing or restricted air movement, viral illness, COVID, flu, RSV, gastroenteritis, dehydration, electrolyte derangements, strep pharyngitis, low suspicion pneumonia.  Do not suspect PE in the absence of hypoxia and likely viral illness given other symptoms.    Will obtain basic blood work alongside viral swabs, chest x-ray in the ED today.  Will defer any breathing treatments given no exacerbation, wheezing, decreased air movement on examination.  After much discussion, was revealed that the patient does not have a formal diagnosis of asthma but had a history of acute bronchitis for which she was given an inhaler in the past.  Will provide medication management for symptoms alongside IV fluids given tachycardia and reevaluate.    Blood work, EKG, chest x-ray negative for acute findings.  Patient influenza A positive in the ED.  Feeling much improved following medications and fluids.  Agreeable to discharge home and supportive care.  Did discuss Tamiflu therapy but ultimately deferred by patient.    Supportive care discussed.  Return precautions discussed.  Patient be discharged.    Consideration for admission but ultimately discharged because: No current indication for admission based on work up and clinical course, need for placement in observation status, or need for immediate surgical/procedural intervention was found during the emergency department visit.    The patient expressed understanding of and agreement with all items discussed and further progression of care plan.    Amount and/or Complexity of Data Reviewed  Independent Historian: parent  Labs: ordered. Decision-making details documented in ED Course.  Radiology: ordered and independent interpretation performed. Decision-making details documented in ED Course.  ECG/medicine tests: ordered and independent interpretation  performed. Decision-making details documented in ED Course.      Risk  OTC drugs.  Prescription drug management.          ED Course as of 12/29/23 1342   Tue Dec 19, 2023   1527 ECG 12 lead  Sinus tachycardia with a rate of 112, nonischemic and negative for acute injury pattern. [DJ]   1528 X-RAY CHEST 2 VIEWS  Negative for acute cardiopulmonary disease. [DJ]   1528 CBC and differential(!)  No leukocytosis, anemia, thrombocytopenia or thrombocytosis [DJ]   1528 Comprehensive metabolic panel(!)   No electrolyte derangements, transaminitis, kidney dysfunction [DJ]   1529 BhCG, Serum, Qual (if menstruating female and no urine)  Negative [DJ]   1529 SARS-CoV-2 (COVID-19) Nasopharynx(!)  Influenza A positive [DJ]   Fri Dec 29, 2023   1342 Group A Strep by PCR, Throat Throat Swab  Negative; pending at the time of dc. [DJ]      ED Course User Index  [DJ] Shannan Abdi PA C     Clinical Impression      Influenza A     _________________     ED Disposition   Discharge                   Shannan Abdi PA C  12/29/23 1342       Shannan Abdi PA C  12/29/23 1342

## 2023-12-19 NOTE — DISCHARGE INSTRUCTIONS
Your strep result is currently pending.  You can call the hospital back tomorrow to obtain these results.  If it is positive and you require antibiotics, you will receive a call back from the hospital.    You can manage your fevers/pain with over-the-counter medications.You can take acetaminophen (Tylenol) 500-1000 mg for breakthrough pain/fevers. Please be aware your total acetaminophen dosages with all products combined; no more than 1300 mg every 8 hours or 4000 mg daily. Do not take with liver diease.     You can take also anti-inflammatories such as ibuprofen (Advil) 400-600 mg every 6-8 hours or naproxen/naproxen sodium (Aleve) 500/550 mg every 12 hours. Do not take both of these medications together. Do not take if you are on a blood thinner or have been advised to avoid NSAIDs for heart, kidney, or gastric ulcers.     You were given both of the above medications in the ED therefore do not re-dose yourself until the appropriate time after. You were given these medicatons at 2:50PM.     You can obtain over-the-counter combination medications including cold and cough medications, cold and flu medications, DayQuil/NyQuil, Audra-Sunderland plus, TheraFlu to help manage your symptoms. DayQuil and NyQuil combination are very good to use with your current symptomatology as they contain 4 combination medications that assist with multimodal symptom management including congestion, coughing, mucus, pain/fever. Whichever over-the-counter cold and flu medication you obtain, please make sure you are watching your acetaminophen containing product dosage. Acetaminophen is Tylenol. You can take 1300 mg every 8 hours or a maximum of 4 g daily. Please make sure you are not exceeding these dosages with your combination medications or supplemental acetaminophen use as they can be harmful to your liver.     Cepacol or Chloraseptic spray can further be obtained over-the-counter which will help with a sore throat, pain from repeated  coughing.    Please consult a local pharmacist if you take many over-the-counter medications to make sure no interactions exist.     Other supportive care at home that can assist with your symptoms include; hot tea with honey, cough drops, constant hydration, gentle nutrition, adequate sleep and rest. If your symptoms are not improving over the course of the next week or began to worsen significantly, please return to the ED.     Return precautions include fevers that are not decreased following both acetaminophen and ibuprofen, vomiting with the inability to keep food down, chest pain, difficulty breathing, worsening of symptoms after improvement.

## 2023-12-31 NOTE — ED ATTESTATION NOTE
The patient was evaluated and managed by the physician assistant.  I agree with the PA/NP’s history, physical, assessment, and plan of care, with the following exceptions: None         Linda Torres MD  12/30/23 1943

## 2024-01-04 ENCOUNTER — APPOINTMENT (RX ONLY)
Dept: URBAN - METROPOLITAN AREA CLINIC 28 | Facility: CLINIC | Age: 29
Setting detail: DERMATOLOGY
End: 2024-01-04

## 2024-01-04 DIAGNOSIS — L40.8 OTHER PSORIASIS: ICD-10-CM

## 2024-01-04 PROCEDURE — ? PRESCRIPTION MEDICATION MANAGEMENT

## 2024-01-04 PROCEDURE — ? PRESCRIPTION

## 2024-01-04 PROCEDURE — 99214 OFFICE O/P EST MOD 30 MIN: CPT

## 2024-01-04 PROCEDURE — ? COUNSELING

## 2024-01-04 RX ORDER — CLOBETASOL PROPIONATE 0.5 MG/ML
SOLUTION TOPICAL
Qty: 50 | Refills: 5 | Status: ERX | COMMUNITY
Start: 2024-01-04

## 2024-01-04 RX ORDER — KETOCONAZOLE 20 MG/ML
SHAMPOO, SUSPENSION TOPICAL BIW
Qty: 120 | Refills: 5 | Status: ERX | COMMUNITY
Start: 2024-01-04

## 2024-01-04 RX ADMIN — KETOCONAZOLE: 20 SHAMPOO, SUSPENSION TOPICAL at 00:00

## 2024-01-04 RX ADMIN — CLOBETASOL PROPIONATE: 0.5 SOLUTION TOPICAL at 00:00

## 2024-01-04 ASSESSMENT — LOCATION ZONE DERM
LOCATION ZONE: FACE
LOCATION ZONE: NOSE
LOCATION ZONE: SCALP

## 2024-01-04 ASSESSMENT — LOCATION SIMPLE DESCRIPTION DERM
LOCATION SIMPLE: LEFT EYEBROW
LOCATION SIMPLE: LEFT NOSE
LOCATION SIMPLE: RIGHT EYEBROW
LOCATION SIMPLE: ANTERIOR SCALP
LOCATION SIMPLE: RIGHT NOSE

## 2024-01-04 ASSESSMENT — LOCATION DETAILED DESCRIPTION DERM
LOCATION DETAILED: LEFT CENTRAL EYEBROW
LOCATION DETAILED: LEFT NASAL ALA
LOCATION DETAILED: RIGHT NASAL ALA
LOCATION DETAILED: MID-FRONTAL SCALP
LOCATION DETAILED: RIGHT CENTRAL EYEBROW

## 2024-01-04 NOTE — HPI: RASH
What Type Of Note Output Would You Prefer (Optional)?: Standard Output
Is The Patient Presenting As Previously Scheduled?: Yes
How Severe Is Your Rash?: moderate
Is This A New Presentation, Or A Follow-Up?: Rash
Additional History: The oat lent states that the rash started on 12/29/23, this was two weeks after she got her hair braided. She said it is similar to what she was seen for before, two years ago. However, she states that the meds that were prescribed then did not really help her much. Her PCP recently prescribed her Ketoconazole shampoo, which she believes is helping her.

## 2024-01-04 NOTE — PROCEDURE: PRESCRIPTION MEDICATION MANAGEMENT
Render In Strict Bullet Format?: No
Initiate Treatment: Counseled patient on more frequent hair washing - ideally once a week but if this is not possible then at least every other week. \\n\\nRecommended using OTC sal acid shampoo first followed by ketoconazole shampoo. Counseled to lather each shampoo into scalp for 5-10 minutes before rinsing out.\\n\\nketoconazole 2 % shampoo TP Frequency: BIW Sig: Lather up and massage into scalp and behind ears, let sit for 5-10min, then rinse out; Use as a shampoo every other week. Use as face wash a few times a week.\\nclobetasol 0.05 % scalp solution Scalp Sig: Apply to affected areas of scalp twice a day for 2 weeks and then take a break for 1 week. Repeat cycle as needed.
Detail Level: Zone

## 2024-04-14 PROCEDURE — 0X940ZZ DRAINAGE OF RIGHT AXILLA, OPEN APPROACH: ICD-10-PCS | Performed by: EMERGENCY MEDICINE

## 2024-04-14 PROCEDURE — 3E023NZ INTRODUCTION OF ANALGESICS, HYPNOTICS, SEDATIVES INTO MUSCLE, PERCUTANEOUS APPROACH: ICD-10-PCS | Performed by: EMERGENCY MEDICINE

## 2024-04-14 PROCEDURE — 99283 EMERGENCY DEPT VISIT LOW MDM: CPT | Mod: U5,25

## 2024-04-14 PROCEDURE — 3E0333Z INTRODUCTION OF ANTI-INFLAMMATORY INTO PERIPHERAL VEIN, PERCUTANEOUS APPROACH: ICD-10-PCS | Performed by: EMERGENCY MEDICINE

## 2024-04-14 PROCEDURE — 96372 THER/PROPH/DIAG INJ SC/IM: CPT | Mod: 59

## 2024-04-14 PROCEDURE — 96374 THER/PROPH/DIAG INJ IV PUSH: CPT

## 2024-04-15 ENCOUNTER — HOSPITAL ENCOUNTER (EMERGENCY)
Facility: HOSPITAL | Age: 29
Discharge: HOME | End: 2024-04-15
Attending: EMERGENCY MEDICINE
Payer: COMMERCIAL

## 2024-04-15 VITALS
HEART RATE: 84 BPM | HEIGHT: 63 IN | TEMPERATURE: 99 F | BODY MASS INDEX: 26.58 KG/M2 | DIASTOLIC BLOOD PRESSURE: 76 MMHG | SYSTOLIC BLOOD PRESSURE: 116 MMHG | RESPIRATION RATE: 20 BRPM | WEIGHT: 150 LBS | OXYGEN SATURATION: 100 %

## 2024-04-15 DIAGNOSIS — L02.91 ABSCESS: Primary | ICD-10-CM

## 2024-04-15 LAB
ANION GAP SERPL CALC-SCNC: 8 MEQ/L (ref 3–15)
B-HCG UR QL: NEGATIVE
BASOPHILS # BLD: 0.08 K/UL (ref 0.01–0.1)
BASOPHILS NFR BLD: 0.9 %
BUN SERPL-MCNC: 11 MG/DL (ref 7–25)
CALCIUM SERPL-MCNC: 9.1 MG/DL (ref 8.6–10.3)
CHLORIDE SERPL-SCNC: 105 MEQ/L (ref 98–107)
CO2 SERPL-SCNC: 24 MEQ/L (ref 21–31)
CREAT SERPL-MCNC: 0.6 MG/DL (ref 0.6–1.2)
DIFFERENTIAL METHOD BLD: ABNORMAL
EGFRCR SERPLBLD CKD-EPI 2021: >60 ML/MIN/1.73M*2
EOSINOPHIL # BLD: 0.3 K/UL (ref 0.04–0.36)
EOSINOPHIL NFR BLD: 3.4 %
ERYTHROCYTE [DISTWIDTH] IN BLOOD BY AUTOMATED COUNT: 12.6 % (ref 11.7–14.4)
GLUCOSE SERPL-MCNC: 103 MG/DL (ref 70–99)
HCT VFR BLD AUTO: 37.1 % (ref 35–45)
HGB BLD-MCNC: 12.5 G/DL (ref 11.8–15.7)
IMM GRANULOCYTES # BLD AUTO: 0.01 K/UL (ref 0–0.08)
IMM GRANULOCYTES NFR BLD AUTO: 0.1 %
LYMPHOCYTES # BLD: 3.62 K/UL (ref 1.2–3.5)
LYMPHOCYTES NFR BLD: 41.1 %
MCH RBC QN AUTO: 30.5 PG (ref 28–33.2)
MCHC RBC AUTO-ENTMCNC: 33.7 G/DL (ref 32.2–35.5)
MCV RBC AUTO: 90.5 FL (ref 83–98)
MONOCYTES # BLD: 0.81 K/UL (ref 0.28–0.8)
MONOCYTES NFR BLD: 9.2 %
NEUTROPHILS # BLD: 3.99 K/UL (ref 1.7–7)
NEUTS SEG NFR BLD: 45.3 %
NRBC BLD-RTO: 0 %
PDW BLD AUTO: 10.2 FL (ref 9.4–12.3)
PLATELET # BLD AUTO: 211 K/UL (ref 150–369)
POCT TEST: NORMAL
POTASSIUM SERPL-SCNC: 3.7 MEQ/L (ref 3.5–5.1)
RBC # BLD AUTO: 4.1 M/UL (ref 3.93–5.22)
SODIUM SERPL-SCNC: 137 MEQ/L (ref 136–145)
WBC # BLD AUTO: 8.81 K/UL (ref 3.8–10.5)

## 2024-04-15 PROCEDURE — 85025 COMPLETE CBC W/AUTO DIFF WBC: CPT | Performed by: EMERGENCY MEDICINE

## 2024-04-15 PROCEDURE — 85025 COMPLETE CBC W/AUTO DIFF WBC: CPT

## 2024-04-15 PROCEDURE — 36415 COLL VENOUS BLD VENIPUNCTURE: CPT

## 2024-04-15 PROCEDURE — 80048 BASIC METABOLIC PNL TOTAL CA: CPT | Performed by: EMERGENCY MEDICINE

## 2024-04-15 PROCEDURE — 63600000 HC DRUGS/DETAIL CODE

## 2024-04-15 PROCEDURE — 63700000 HC SELF-ADMINISTRABLE DRUG

## 2024-04-15 PROCEDURE — 10060 I&D ABSCESS SIMPLE/SINGLE: CPT

## 2024-04-15 RX ORDER — DOXYCYCLINE HYCLATE 100 MG
100 TABLET ORAL ONCE
Status: COMPLETED | OUTPATIENT
Start: 2024-04-15 | End: 2024-04-15

## 2024-04-15 RX ORDER — DOXYCYCLINE 100 MG/1
100 CAPSULE ORAL 2 TIMES DAILY
Qty: 20 CAPSULE | Refills: 0 | Status: SHIPPED | OUTPATIENT
Start: 2024-04-15 | End: 2024-04-25

## 2024-04-15 RX ORDER — MORPHINE SULFATE 2 MG/ML
2 INJECTION, SOLUTION INTRAMUSCULAR; INTRAVENOUS ONCE
Status: COMPLETED | OUTPATIENT
Start: 2024-04-15 | End: 2024-04-15

## 2024-04-15 RX ORDER — KETOROLAC TROMETHAMINE 15 MG/ML
15 INJECTION, SOLUTION INTRAMUSCULAR; INTRAVENOUS ONCE
Status: COMPLETED | OUTPATIENT
Start: 2024-04-15 | End: 2024-04-15

## 2024-04-15 RX ORDER — ACETAMINOPHEN 325 MG/1
650 TABLET ORAL ONCE
Status: COMPLETED | OUTPATIENT
Start: 2024-04-15 | End: 2024-04-15

## 2024-04-15 RX ORDER — IBUPROFEN 600 MG/1
600 TABLET ORAL EVERY 6 HOURS PRN
Qty: 30 TABLET | Refills: 0 | Status: SHIPPED | OUTPATIENT
Start: 2024-04-15 | End: 2024-04-25

## 2024-04-15 RX ADMIN — ACETAMINOPHEN 650 MG: 325 TABLET ORAL at 01:16

## 2024-04-15 RX ADMIN — DOXYCYCLINE HYCLATE 100 MG: 100 TABLET ORAL at 02:03

## 2024-04-15 RX ADMIN — MORPHINE SULFATE 2 MG: 2 INJECTION, SOLUTION INTRAMUSCULAR; INTRAVENOUS at 01:11

## 2024-04-15 RX ADMIN — KETOROLAC TROMETHAMINE 15 MG: 15 INJECTION, SOLUTION INTRAMUSCULAR; INTRAVENOUS at 02:12

## 2024-04-15 ASSESSMENT — ENCOUNTER SYMPTOMS
ABDOMINAL PAIN: 0
WOUND: 1
CHILLS: 1
FEVER: 0
SHORTNESS OF BREATH: 0
COLOR CHANGE: 1

## 2024-04-15 NOTE — ED PROVIDER NOTES
Emergency Medicine Note  HPI   HISTORY OF PRESENT ILLNESS     Irene is a 29-year-old female with a past medical history of asthma who presents the emergency department complaining of an abscess in her right axilla.  Patient states she noted pain in the right axilla 2 days ago and it became progressively worse over the past 2 days.  Patient reports low-grade fevers at home of 99 °F.  Patient states that she tried warm compresses on the area without any improvement.  Reports that she has had an abscess in this area before that required drainage.          Patient History   PAST HISTORY     Reviewed from Nursing Triage:       Past Medical History:   Diagnosis Date    Asthma        No past surgical history on file.    No family history on file.    Social History     Tobacco Use    Smoking status: Never    Smokeless tobacco: Never   Substance Use Topics    Alcohol use: No    Drug use: Yes     Types: Marijuana         Review of Systems   REVIEW OF SYSTEMS     Review of Systems   Constitutional:  Positive for chills. Negative for fever.   Respiratory:  Negative for shortness of breath.    Gastrointestinal:  Negative for abdominal pain.   Skin:  Positive for color change and wound.         VITALS     ED Vitals      Date/Time Temp Pulse Resp BP SpO2 [facility]   04/15/24 0205 -- 84 20 116/76 100 % [facility]   04/15/24 0107 -- 84 18 115/59 100 % [facility]   04/14/24 2300 37.2 °C (99 °F) 89 18 113/64 99 % [facility]                         Physical Exam   PHYSICAL EXAM     Physical Exam  Vitals and nursing note reviewed.   Constitutional:       Appearance: Normal appearance.   HENT:      Head: Normocephalic.   Cardiovascular:      Rate and Rhythm: Normal rate.      Pulses: Normal pulses.   Pulmonary:      Effort: Pulmonary effort is normal.      Breath sounds: Normal breath sounds.   Skin:     General: Skin is warm and dry.      Comments: Abscess in right axilla   Neurological:      Mental Status: She is alert.   Psychiatric:         Mood and Affect:  Mood normal.         Behavior: Behavior normal.           PROCEDURES     Incision and Drainage    Date/Time: 4/15/2024 3:16 AM    Performed by: Leigh Denise PA C  Authorized by: Shirley Shaffer DO    Consent:     Consent obtained:  Verbal    Consent given by:  Patient    Risks, benefits, and alternatives were discussed: yes      Risks discussed:  Incomplete drainage, infection and bleeding  Universal protocol:     Procedure explained and questions answered to patient or proxy's satisfaction: yes      Patient identity confirmed:  Verbally with patient and arm band  Location:     Type:  Abscess    Size:  2 x 2 cm    Location:  Upper extremity    Upper extremity location: Axilla.  Pre-procedure details:     Skin preparation:  Povidone-iodine  Anesthesia:     Anesthesia method:  Local infiltration    Local anesthetic:  Lidocaine 1% w/o epi  Procedure type:     Complexity:  Simple  Procedure details:     Ultrasound guidance: no      Incision types:  Stab incision    Incision depth:  Dermal    Wound management:  Probed and deloculated    Drainage:  Bloody and purulent    Drainage amount:  Moderate    Packing materials:  1/4 in iodoform gauze  Post-procedure details:     Procedure completion:  Tolerated well, no immediate complications       DATA     Results       Procedure Component Value Units Date/Time    Basic metabolic panel [[id number]]  (Abnormal) Collected: 04/15/24 0109    Specimen: Blood, Venous Updated: 04/15/24 0203     Sodium 137 mEQ/L      Potassium 3.7 mEQ/L      Comment: Results obtained on plasma. Plasma Potassium values may be up to 0.4 mEQ/L less than serum values. The differences may be greater for patients with high platelet or white cell counts.        Chloride 105 mEQ/L      CO2 24 mEQ/L      BUN 11 mg/dL      Creatinine 0.6 mg/dL      Glucose 103 mg/dL      Calcium 9.1 mg/dL      eGFR >60.0 mL/min/1.73m*2      Comment: Calculation based on the Chronic Kidney Disease Epidemiology  Collaboration (CKD-EPI) equation refit without adjustment for race.        Anion Gap 8 mEQ/L     CBC and differential [[id number]]  (Abnormal) Collected: 04/15/24 0109    Specimen: Blood, Venous Updated: 04/15/24 0135     WBC 8.81 K/uL      RBC 4.10 M/uL      Hemoglobin 12.5 g/dL      Hematocrit 37.1 %      MCV 90.5 fL      MCH 30.5 pg      MCHC 33.7 g/dL      RDW 12.6 %      Platelets 211 K/uL      MPV 10.2 fL      Differential Type Auto     nRBC 0.0 %      Immature Granulocytes 0.1 %      Neutrophils 45.3 %      Lymphocytes 41.1 %      Monocytes 9.2 %      Eosinophils 3.4 %      Basophils 0.9 %      Immature Granulocytes, Absolute 0.01 K/uL      Neutrophils, Absolute 3.99 K/uL      Lymphocytes, Absolute 3.62 K/uL      Monocytes, Absolute 0.81 K/uL      Eosinophils, Absolute 0.30 K/uL      Basophils, Absolute 0.08 K/uL             Imaging Results    None         No orders to display       Scoring tools                                  ED Course & MDM   MDM / ED COURSE / CLINICAL IMPRESSION / DISPO     Medical Decision Making  29-year-old female presents the emergency department complaining of an abscess in her right axilla.  ED course is outlined below.    Problems Addressed:  Abscess: acute illness or injury    Amount and/or Complexity of Data Reviewed  Independent Historian: parent     Details: Patient's mom at bedside and assist with history.  Labs: ordered. Decision-making details documented in ED Course.    Risk  OTC drugs.  Prescription drug management.        ED Course as of 04/15/24 0234   Mon Apr 15, 2024   0158 CBC unremarkable.  [SS]   0158 POCT BhCG, Urine Qual: Negative [SS]   0204 Abscess drained at bedside.  Patient tolerated the procedure well.  Patient discharged home with prescription for doxycycline. [SS]      ED Course User Index  [SS] Leigh Denise PA C     Clinical Impression      Abscess     _________________       ED Disposition   Discharge                       Leigh Denise  KARTHIK [place]  04/15/24 0315

## 2024-04-15 NOTE — DISCHARGE INSTRUCTIONS
You were seen and evaluated today in the emergency department for your abscess.  This was drained during your ER visit.  For your abscess, prescription for an antibiotic called doxycycline has been sent to your pharmacy.  Please take as prescribed.    As discussed, return to the ER for new or worsening symptoms.

## 2024-04-15 NOTE — ED ATTESTATION NOTE
The patient was evaluated and managed by the physician assistant / nurse practitioner.     Shirley Shaffer DO  04/15/24 0635    
none

## 2024-11-13 ENCOUNTER — HOSPITAL ENCOUNTER (EMERGENCY)
Facility: HOSPITAL | Age: 29
Discharge: HOME | End: 2024-11-13
Attending: EMERGENCY MEDICINE
Payer: COMMERCIAL

## 2024-11-13 ENCOUNTER — APPOINTMENT (EMERGENCY)
Dept: RADIOLOGY | Facility: HOSPITAL | Age: 29
End: 2024-11-13
Payer: COMMERCIAL

## 2024-11-13 VITALS
WEIGHT: 135.58 LBS | DIASTOLIC BLOOD PRESSURE: 57 MMHG | HEART RATE: 73 BPM | SYSTOLIC BLOOD PRESSURE: 106 MMHG | RESPIRATION RATE: 18 BRPM | BODY MASS INDEX: 24.02 KG/M2 | TEMPERATURE: 98.5 F | OXYGEN SATURATION: 98 %

## 2024-11-13 DIAGNOSIS — T14.8XXA ANIMAL BITE: Primary | ICD-10-CM

## 2024-11-13 LAB
B-HCG UR QL: NEGATIVE
POCT TEST: NORMAL

## 2024-11-13 PROCEDURE — 90471 IMMUNIZATION ADMIN: CPT | Performed by: PHYSICIAN ASSISTANT

## 2024-11-13 PROCEDURE — 90375 RABIES IG IM/SC: CPT | Performed by: PHYSICIAN ASSISTANT

## 2024-11-13 PROCEDURE — 90675 RABIES VACCINE IM: CPT | Mod: JZ | Performed by: PHYSICIAN ASSISTANT

## 2024-11-13 PROCEDURE — 3E0134Z INTRODUCTION OF SERUM, TOXOID AND VACCINE INTO SUBCUTANEOUS TISSUE, PERCUTANEOUS APPROACH: ICD-10-PCS | Performed by: EMERGENCY MEDICINE

## 2024-11-13 PROCEDURE — 96372 THER/PROPH/DIAG INJ SC/IM: CPT

## 2024-11-13 PROCEDURE — 99283 EMERGENCY DEPT VISIT LOW MDM: CPT | Mod: U5,25

## 2024-11-13 PROCEDURE — 63600000 HC DRUGS/DETAIL CODE: Mod: JZ | Performed by: PHYSICIAN ASSISTANT

## 2024-11-13 PROCEDURE — 73590 X-RAY EXAM OF LOWER LEG: CPT | Mod: RT

## 2024-11-13 RX ADMIN — RABIES IMMUNE GLOBULIN (HUMAN) 1230 UNITS: 300 INJECTION, SOLUTION INFILTRATION; INTRAMUSCULAR at 20:11

## 2024-11-13 RX ADMIN — RABIES VACCINE 1 ML: KIT at 18:42

## 2024-11-13 ASSESSMENT — ENCOUNTER SYMPTOMS
NUMBNESS: 0
WOUND: 1
FEVER: 0
CONFUSION: 0
WEAKNESS: 0
ARTHRALGIAS: 1

## 2024-11-13 NOTE — LETTER
Irene Wyman was seen and treated in our emergency department on 11/13/2024.  Irene Wyman may return to work on @ 11/18/2024  If you have any questions or concerns, please don't hesitate to call.

## 2024-11-13 NOTE — DISCHARGE INSTRUCTIONS
Rabies vaccine instructions:    You were given a dose of rabies vaccine today in the Emergency Department.    If this was your initial visit, you may also have been given rabies immune globulin if you were not previously vaccinated against rabies.    Rabies vaccine consists of 4 separate doses.  You received your 1st dose today.  The other 3 doses need to be given on days:  day 3 - 11/16, day 7 - 11/20, and day 14 - 11/27      Please contact [facility] urgent care at [phone] to arrange any further doses of your rabies vaccine.  You may also return to the ER if needed.     Adverse reactions to rabies vaccine and immune globulin are not common.  Mild, local reactions to the rabies vaccine, such as pain, redness, swelling, or itching at the injection site, have been reported. Rarely, symptoms such as headache, nausea, abdominal pain, muscle aches, and dizziness have been reported. Local pain and low-grade fever may follow injection of rabies immune globulin.

## 2024-11-13 NOTE — ED PROVIDER NOTES
Emergency Medicine Note  HPI   HISTORY OF PRESENT ILLNESS     30 yo female presents w dog bite to r lower leg today.  States was walking on street,  bit by dog that was on a leash with the owner.   Patient did not know she was bit until she went into the store.  Does not know where the dog currently is at or where the owner lives.  Seen at urgent care today, got tetanus vaccination and prescription for Augmentin, and sent here for rabies vaccination.  Patient states she filed a police report on scene, was told police would notify animal control.  Ambulated on scene.      History provided by:  Patient  Animal Bite  Associated symptoms: no fever and no numbness          Patient History   PAST HISTORY     Reviewed from Nursing Triage:       Past Medical History:   Diagnosis Date   • Asthma        No past surgical history on file.    No family history on file.    Social History     Tobacco Use   • Smoking status: Never   • Smokeless tobacco: Never   Substance Use Topics   • Alcohol use: No   • Drug use: Yes     Types: Marijuana         Review of Systems   REVIEW OF SYSTEMS     Review of Systems   Constitutional:  Negative for fever.   Musculoskeletal:  Positive for arthralgias.   Skin:  Positive for wound.   Neurological:  Negative for weakness and numbness.   Psychiatric/Behavioral:  Negative for confusion.          VITALS     ED Vitals      Date/Time Temp Pulse Resp BP SpO2 [facility]   11/13/24 1749 -- 84 16 109/58 100 % CV   11/13/24 1515 36.9 °C (98.5 °F) 80 16 101/56 100 % HT                         Physical Exam   PHYSICAL EXAM     Physical Exam  Vitals and nursing note reviewed.   Constitutional:       General: She is not in acute distress.     Appearance: She is not ill-appearing or toxic-appearing.   HENT:      Head: Normocephalic and atraumatic.   Eyes:      Conjunctiva/sclera: Conjunctivae normal.   Pulmonary:      Effort: Pulmonary effort is normal. No respiratory distress.   Musculoskeletal:      Cervical back:  Neck supple.        Legs:    Neurological:      Mental Status: She is alert.   Psychiatric:         Mood and Affect: Mood normal.           PROCEDURES     Procedures     DATA     Results       None            Imaging Results              X-RAY TIBIA FIBULA RIGHT 2 VIEWS (Final result)  Result time 11/13/24 18:14:55      Final result                   Impression:    IMPRESSION: No fracture or foreign body.    COMMENT: 2 views of the right tibia and fibula demonstrate no acute fracture or  radiopaque foreign body.               Narrative:    CLINICAL HISTORY: Drug bite.                                      No orders to display       Scoring tools                                  ED Course & MDM   MDM / ED COURSE / CLINICAL IMPRESSION / DISPO     Medical Decision Making  Problems Addressed:  Animal bite: acute illness or injury    Amount and/or Complexity of Data Reviewed  Labs:  Decision-making details documented in ED Course.  Radiology: ordered and independent interpretation performed. Decision-making details documented in ED Course.     Details: My interpretation x-ray: No fracture, no foreign body    Risk  Prescription drug management.  Risk Details: Consideration for admission but ultimately will plan for discharge. There is no current indication for admission based on imaging, clinical course, and presenting complaint  that was found during the emergency department visit.              ED Course as of 11/14/24 0014   Wed Nov 13, 2024   1802 POCT BhCG, Urine Qual: Negative [CL]   2005 2ml of rabies IG injected around the bite wound on the R lower leg.  RN gave rest of rabies IG as IM injection.     X-ray shows no fracture, no foreign body.  Soft compartments.  Distal pulses intact.  Wound is mild, puncture wounds.  No laceration.  Ambulates independently.  Patient advised to start the Augmentin that she received from urgent care.  She will return as directed for the rest of the rabies vaccination series. [CL]       ED Course User Index  [CL] Alissa Brush PA C     Clinical Impression      None                 Alissa Brush PA C  11/13/24 2021

## 2024-11-14 NOTE — ED ATTESTATION NOTE
The patient was evaluated and managed by the physician assistant / nurse practitioner.      Yvonne Krueger MD  11/13/24 6881

## 2024-11-17 ENCOUNTER — HOSPITAL ENCOUNTER (EMERGENCY)
Facility: HOSPITAL | Age: 29
Discharge: HOME | End: 2024-11-17
Attending: EMERGENCY MEDICINE
Payer: COMMERCIAL

## 2024-11-17 VITALS
RESPIRATION RATE: 20 BRPM | BODY MASS INDEX: 23.92 KG/M2 | WEIGHT: 135 LBS | HEART RATE: 64 BPM | HEIGHT: 63 IN | OXYGEN SATURATION: 98 % | TEMPERATURE: 98.6 F | DIASTOLIC BLOOD PRESSURE: 62 MMHG | SYSTOLIC BLOOD PRESSURE: 106 MMHG

## 2024-11-17 DIAGNOSIS — Z23 ENCOUNTER FOR REPEAT ADMINISTRATION OF RABIES VACCINATION: Primary | ICD-10-CM

## 2024-11-17 PROCEDURE — 3E023GC INTRODUCTION OF OTHER THERAPEUTIC SUBSTANCE INTO MUSCLE, PERCUTANEOUS APPROACH: ICD-10-PCS | Performed by: EMERGENCY MEDICINE

## 2024-11-17 PROCEDURE — 99281 EMR DPT VST MAYX REQ PHY/QHP: CPT | Mod: 25,U5

## 2024-11-17 PROCEDURE — 3E0234Z INTRODUCTION OF SERUM, TOXOID AND VACCINE INTO MUSCLE, PERCUTANEOUS APPROACH: ICD-10-PCS | Performed by: EMERGENCY MEDICINE

## 2024-11-17 PROCEDURE — 63600000 HC DRUGS/DETAIL CODE: Mod: JZ | Performed by: EMERGENCY MEDICINE

## 2024-11-17 PROCEDURE — 90471 IMMUNIZATION ADMIN: CPT | Performed by: EMERGENCY MEDICINE

## 2024-11-17 PROCEDURE — 90675 RABIES VACCINE IM: CPT | Mod: JZ | Performed by: EMERGENCY MEDICINE

## 2024-11-17 PROCEDURE — 96372 THER/PROPH/DIAG INJ SC/IM: CPT | Mod: 59

## 2024-11-17 RX ADMIN — RABIES VACCINE 1 ML: KIT at 20:23

## 2024-11-18 NOTE — ED PROVIDER NOTES
Emergency Medicine Note  HPI   HISTORY OF PRESENT ILLNESS     Patient is a 21-year-old female presenting for her second rabies vaccine.  Was bitten by a dog on her right leg 4 days ago.  Unsure of dog's vaccine status, prompting initiation of rabies vaccine series.  She was post return for second vaccine yesterday, but was unable to due to a long workday.  She denies any other symptoms since the initial vaccine series.  Reports the wound appears to be healing well, taking her antibiotic without issue          Patient History   PAST HISTORY     Reviewed from Nursing Triage:       Past Medical History:   Diagnosis Date    Asthma        No past surgical history on file.    No family history on file.    Social History     Tobacco Use    Smoking status: Never    Smokeless tobacco: Never   Substance Use Topics    Alcohol use: No    Drug use: Yes     Types: Marijuana         Review of Systems   REVIEW OF SYSTEMS     Review of Systems      VITALS     ED Vitals      Date/Time Temp Pulse Resp BP SpO2 [facility]   11/17/24 2035 -- 64 20 106/62 98 % [facility]   11/17/24 1941 37 °C (98.6 °F) 91 16 115/55 99 % [facility]                         Physical Exam   PHYSICAL EXAM     Physical Exam  Vitals and nursing note reviewed.   HENT:      Head: Normocephalic and atraumatic.      Mouth/Throat:      Mouth: Mucous membranes are moist.   Eyes:      Conjunctiva/sclera: Conjunctivae normal.   Cardiovascular:      Rate and Rhythm: Normal rate.      Heart sounds: Normal heart sounds.   Pulmonary:      Effort: Pulmonary effort is normal.      Breath sounds: Normal breath sounds.   Abdominal:      General: Abdomen is flat.      Palpations: Abdomen is soft.   Musculoskeletal:         General: Normal range of motion.   Skin:     General: Skin is dry.      Comments: Puncture wounds on right leg appear to be well-healing, no surrounding erythema or streaking up the leg.  N/V intact distally   Neurological:      Mental Status: She is alert and oriented to person,  place, and time. Mental status is at baseline.   Psychiatric:         Mood and Affect: Mood normal.           PROCEDURES     Procedures     DATA     Results       None            Imaging Results    None         No orders to display       Scoring tools                                  ED Course & MDM   MDM / ED COURSE / CLINICAL IMPRESSION / DISPO     Medical Decision Making  29-year-old female presenting for 2nd rabies vaccine  Afebrile in triage, VS WL for age  Physical exam as above  No obvious complication involving wound on her leg.  No other systemic symptoms endorsed  RabAvert ordered.  Patient did present a day late for second vaccine, as such did advise she push the remaining shots back one day as well. Advised return in 4 days for 3rd shot. Pt agreeable, no further questions    Risk  Prescription drug management.           Clinical Impression      Encounter for repeat administration of rabies vaccination     _________________       ED Disposition   Discharge                       Ralph Oshea PA C  11/18/24 0119

## 2024-11-19 NOTE — ED ATTESTATION NOTE
The patient was evaluated and managed by the physician assistant / nurse practitioner.       Will Johnson MD  11/19/24 5228

## 2024-11-21 ENCOUNTER — HOSPITAL ENCOUNTER (EMERGENCY)
Facility: HOSPITAL | Age: 29
Discharge: HOME | End: 2024-11-21
Attending: EMERGENCY MEDICINE
Payer: COMMERCIAL

## 2024-11-21 VITALS
TEMPERATURE: 98.4 F | DIASTOLIC BLOOD PRESSURE: 57 MMHG | OXYGEN SATURATION: 97 % | HEART RATE: 79 BPM | RESPIRATION RATE: 16 BRPM | SYSTOLIC BLOOD PRESSURE: 112 MMHG

## 2024-11-21 DIAGNOSIS — Z23 ENCOUNTER FOR REPEAT ADMINISTRATION OF RABIES VACCINATION: Primary | ICD-10-CM

## 2024-11-21 PROCEDURE — 3E0134Z INTRODUCTION OF SERUM, TOXOID AND VACCINE INTO SUBCUTANEOUS TISSUE, PERCUTANEOUS APPROACH: ICD-10-PCS | Performed by: EMERGENCY MEDICINE

## 2024-11-21 PROCEDURE — 63600000 HC DRUGS/DETAIL CODE: Mod: JZ

## 2024-11-21 PROCEDURE — 90675 RABIES VACCINE IM: CPT | Mod: JZ

## 2024-11-21 PROCEDURE — 90471 IMMUNIZATION ADMIN: CPT

## 2024-11-21 PROCEDURE — 99283 EMERGENCY DEPT VISIT LOW MDM: CPT | Mod: U5,25

## 2024-11-21 PROCEDURE — 96372 THER/PROPH/DIAG INJ SC/IM: CPT

## 2024-11-21 RX ADMIN — RABIES VACCINE 1 ML: KIT at 23:17

## 2024-11-21 ASSESSMENT — ENCOUNTER SYMPTOMS
SHORTNESS OF BREATH: 0
NUMBNESS: 0
VOMITING: 0
COLOR CHANGE: 0
DIARRHEA: 0
COUGH: 0
ABDOMINAL PAIN: 0
WEAKNESS: 0
FEVER: 0

## 2024-11-22 NOTE — ED PROVIDER NOTES
Emergency Medicine Note  HPI   HISTORY OF PRESENT ILLNESS     29-year-old female presenting for her third rabies vaccination.  Was bitten by a dog on her right leg on 11/13.  Unknown vaccine status for the dog which prompted the initiation of rabies vaccine series.  Last received her rabies vaccine on 11/17.  States that the wound is healing well with no surrounding erythema, pain, drainage.  She denies fevers or chills, abdominal pain, nausea or vomiting, cough, shortness of breath, weakness, fatigue, urinary symptoms.          Patient History   PAST HISTORY     Reviewed from Nursing Triage:       Past Medical History:   Diagnosis Date    Asthma        No past surgical history on file.    No family history on file.    Social History     Tobacco Use    Smoking status: Never    Smokeless tobacco: Never   Substance Use Topics    Alcohol use: No    Drug use: Yes     Types: Marijuana         Review of Systems   REVIEW OF SYSTEMS     Review of Systems   Constitutional:  Negative for fever.   Respiratory:  Negative for cough and shortness of breath.    Cardiovascular:  Negative for chest pain.   Gastrointestinal:  Negative for abdominal pain, diarrhea and vomiting.   Skin:  Negative for color change and rash.   Neurological:  Negative for weakness and numbness.         VITALS     ED Vitals      Date/Time Temp Pulse Resp BP SpO2 [facility]   11/21/24 2041 36.9 °C (98.4 °F) 79 16 112/57 97 % AA                         Physical Exam   PHYSICAL EXAM     Physical Exam  Vitals and nursing note reviewed.   Constitutional:       General: She is not in acute distress.     Appearance: She is well-developed.   HENT:      Head: Atraumatic.   Eyes:      Conjunctiva/sclera: Conjunctivae normal.   Cardiovascular:      Rate and Rhythm: Normal rate and regular rhythm.      Pulses: Normal pulses.      Heart sounds: Normal heart sounds.   Pulmonary:      Effort: Pulmonary effort is normal.   Abdominal:      General: Bowel sounds are normal.       Palpations: Abdomen is soft.   Musculoskeletal:         General: No deformity.   Skin:     General: Skin is warm and dry.   Neurological:      Mental Status: She is alert. Mental status is at baseline.   Psychiatric:         Behavior: Behavior normal.           PROCEDURES     Procedures     DATA     Results       None            Imaging Results    None         No orders to display       Scoring tools                                  ED Course & MDM   MDM / ED COURSE / CLINICAL IMPRESSION / DISPO     Medical Decision Making  Patient here for third round of rabies vaccination.  Vital signs stable.  Wound well-healing.  Third vaccine ordered.  Strict follow-up precautions provided for last round of series.  Will discharge home    Problems Addressed:  Encounter for repeat administration of rabies vaccination: acute illness or injury    Risk  Prescription drug management.           Clinical Impression      Encounter for repeat administration of rabies vaccination     _________________       ED Disposition   Discharge                       Mariah Nascimento PA C  11/21/24 5919

## 2024-11-22 NOTE — ED ATTESTATION NOTE
The patient was evaluated and managed by the physician assistant / nurse practitioner.  During the patient visit, I was present in the department and was available for consultation/patient evaluation.       Amarjit Hernandez MD  11/22/24 4488

## 2024-11-25 ENCOUNTER — HOSPITAL ENCOUNTER (EMERGENCY)
Facility: HOSPITAL | Age: 29
Discharge: HOME | End: 2024-11-25
Attending: EMERGENCY MEDICINE
Payer: COMMERCIAL

## 2024-11-25 VITALS
OXYGEN SATURATION: 97 % | TEMPERATURE: 98.8 F | RESPIRATION RATE: 18 BRPM | DIASTOLIC BLOOD PRESSURE: 59 MMHG | HEART RATE: 89 BPM | SYSTOLIC BLOOD PRESSURE: 113 MMHG

## 2024-11-25 DIAGNOSIS — Z51.89 VISIT FOR WOUND CHECK: Primary | ICD-10-CM

## 2024-11-25 PROCEDURE — 99281 EMR DPT VST MAYX REQ PHY/QHP: CPT | Mod: U5

## 2024-11-25 ASSESSMENT — ENCOUNTER SYMPTOMS
NUMBNESS: 0
HEMATURIA: 0
CHILLS: 0
DIARRHEA: 0
VOMITING: 0
DIAPHORESIS: 0
SORE THROAT: 0
WEAKNESS: 0
BACK PAIN: 0
FEVER: 0
FATIGUE: 0
FLANK PAIN: 0
COUGH: 0
LIGHT-HEADEDNESS: 0
NERVOUS/ANXIOUS: 0
DIZZINESS: 0
DYSURIA: 0
NAUSEA: 0
FREQUENCY: 0
RHINORRHEA: 0
SINUS PRESSURE: 0
HEADACHES: 0
ABDOMINAL PAIN: 0
SHORTNESS OF BREATH: 0

## 2024-11-26 NOTE — ED ATTESTATION NOTE
The patient was evaluated and managed by the physician assistant / nurse practitioner.       Easton Billings MD  11/26/24 0604

## 2024-11-26 NOTE — DISCHARGE INSTRUCTIONS
Come back on Thursday, 11/28/2024 for your last rabies vaccine.  Return sooner if any new, new worsening or concerning symptoms or signs of infection.

## 2024-11-26 NOTE — ED PROVIDER NOTES
Emergency Medicine Note  HPI   HISTORY OF PRESENT ILLNESS     29-year-old female with PMH asthma presenting for repeat rabies vaccination.  Was initially seen on 11/13/2024 after dog bite to her right lower leg.  Was given IM immunoglobulin, and rabies injection.  Had second dose 11/17/2024, third dose 11/21/2024, is here presenting for last vaccine - should be presenting on day 14 (which would be 11/28).  Patient states she is confused about what she recommends, states that after her last vaccine she did have some decreased p.o. intake and chills, but otherwise no other allergic type reaction.  States her wound has been healing appropriately, no other fever/chills or issues with wound healing.  Take antibiotics for      History provided by:  Patient and medical records   used: No          Patient History   PAST HISTORY     Reviewed from Nursing Triage:       Past Medical History:   Diagnosis Date    Asthma        No past surgical history on file.    No family history on file.    Social History     Tobacco Use    Smoking status: Never    Smokeless tobacco: Never   Substance Use Topics    Alcohol use: No    Drug use: Yes     Types: Marijuana         Review of Systems   REVIEW OF SYSTEMS     Review of Systems   Constitutional:  Negative for chills, diaphoresis, fatigue and fever.   HENT:  Negative for congestion, rhinorrhea, sinus pressure and sore throat.    Eyes:  Negative for visual disturbance.   Respiratory:  Negative for cough and shortness of breath.    Cardiovascular:  Negative for chest pain and leg swelling.   Gastrointestinal:  Negative for abdominal pain, diarrhea, nausea and vomiting.   Genitourinary:  Negative for dysuria, flank pain, frequency and hematuria.   Musculoskeletal:  Negative for back pain.   Skin:  Negative for rash.   Neurological:  Negative for dizziness, weakness, light-headedness, numbness and headaches.   Psychiatric/Behavioral:  The patient is not nervous/anxious.           VITALS     ED Vitals      Date/Time Temp Pulse Resp BP SpO2 [facility]   11/25/24 2233 37.1 °C (98.8 °F) 89 18 113/59 97 % TO                         Physical Exam   PHYSICAL EXAM     Physical Exam  Vitals and nursing note reviewed.   Constitutional:       Appearance: She is well-developed.   HENT:      Head: Normocephalic and atraumatic.   Eyes:      Conjunctiva/sclera: Conjunctivae normal.   Cardiovascular:      Rate and Rhythm: Normal rate and regular rhythm.   Pulmonary:      Effort: Pulmonary effort is normal.      Breath sounds: Normal breath sounds.   Abdominal:      General: There is no distension.      Palpations: Abdomen is soft. There is no mass.      Tenderness: There is no abdominal tenderness.   Musculoskeletal:         General: No tenderness or deformity. Normal range of motion.      Cervical back: Normal range of motion.   Skin:     General: Skin is warm and dry.      Comments: Right lateral knee with well-healing wound, no surrounding erythema, edema or drainage.  No pain with micromotion.   Neurological:      Mental Status: She is alert. Mental status is at baseline.   Psychiatric:         Behavior: Behavior normal.           PROCEDURES     Procedures     DATA     Results       None            Imaging Results    None         No orders to display       Scoring tools                                  ED Course & MDM   MDM / ED COURSE / CLINICAL IMPRESSION / DISPO     Medical Decision Making  29-year-old female presenting for final rabies vaccination, unfortunately did not come on the correct day, directed to come back on Thursday which would be the appropriate date.  Given strict ER return precautions, recheck wound which appears to be healing well without signs of infection.  Answer questions. stable discharge.  Discussed with attending    Problems Addressed:  Visit for wound check: acute illness or injury    Amount and/or Complexity of Data Reviewed  External Data Reviewed: notes.     Details:  Urgent care visit 9/23/2024           Clinical Impression      Visit for wound check     _________________       ED Disposition   Discharge                       Leigh Dumas PA C  11/25/24 3572

## 2024-11-28 ENCOUNTER — HOSPITAL ENCOUNTER (EMERGENCY)
Facility: HOSPITAL | Age: 29
Discharge: HOME | End: 2024-11-29
Attending: EMERGENCY MEDICINE
Payer: COMMERCIAL

## 2024-11-28 DIAGNOSIS — Z23 ENCOUNTER FOR REPEAT ADMINISTRATION OF RABIES VACCINATION: Primary | ICD-10-CM

## 2024-11-28 PROCEDURE — 99283 EMERGENCY DEPT VISIT LOW MDM: CPT | Mod: U5,25

## 2024-11-28 PROCEDURE — 3E0234Z INTRODUCTION OF SERUM, TOXOID AND VACCINE INTO MUSCLE, PERCUTANEOUS APPROACH: ICD-10-PCS | Performed by: EMERGENCY MEDICINE

## 2024-11-28 ASSESSMENT — ENCOUNTER SYMPTOMS
DIARRHEA: 0
VOMITING: 0
SINUS PRESSURE: 0
RHINORRHEA: 0
NERVOUS/ANXIOUS: 0
WEAKNESS: 0
FEVER: 0
NUMBNESS: 0
NAUSEA: 0
LIGHT-HEADEDNESS: 0
ABDOMINAL PAIN: 0
FATIGUE: 0
SORE THROAT: 0
CHILLS: 0
FLANK PAIN: 0
SHORTNESS OF BREATH: 0
COUGH: 0
DIAPHORESIS: 0
FREQUENCY: 0
DYSURIA: 0
DIZZINESS: 0
HEADACHES: 0
BACK PAIN: 0
HEMATURIA: 0

## 2024-11-29 VITALS
SYSTOLIC BLOOD PRESSURE: 107 MMHG | OXYGEN SATURATION: 98 % | DIASTOLIC BLOOD PRESSURE: 59 MMHG | HEART RATE: 80 BPM | RESPIRATION RATE: 17 BRPM | TEMPERATURE: 98.1 F

## 2024-11-29 LAB
B-HCG UR QL: NEGATIVE
POCT TEST: NORMAL

## 2024-11-29 PROCEDURE — 90471 IMMUNIZATION ADMIN: CPT

## 2024-11-29 PROCEDURE — 90675 RABIES VACCINE IM: CPT | Mod: JZ

## 2024-11-29 PROCEDURE — 63600000 HC DRUGS/DETAIL CODE: Mod: JZ

## 2024-11-29 PROCEDURE — 96372 THER/PROPH/DIAG INJ SC/IM: CPT

## 2024-11-29 RX ADMIN — RABIES VACCINE 1 ML: KIT at 01:23

## 2024-11-29 NOTE — ED ATTESTATION NOTE
The patient was evaluated and managed by the physician assistant under my supervision.   I agree with the PA's assessment and plan.      Tevin Ramirez MD  11/29/24 0202

## 2024-11-29 NOTE — ED PROVIDER NOTES
Emergency Medicine Note  HPI   HISTORY OF PRESENT ILLNESS     29-year-old female with PMH of asthma presenting for her fourth rabies vaccination.  Her last dose was 11/21/2024 and she had come 3 days ago to get the vaccine but because she had arrived too early, she was instructed to come back today.  Patient already took a round of Augmentin, and is updated on tetanus.  She said her wound has been healing well.  Denies fever/chills.  Patient did have a mild reaction to her last vaccination where she felt mild fever/chills, but denies other allergic symptoms such as hives, difficulty breathing, numbness or tingling.      History provided by:  Medical records and patient   used: No          Patient History   PAST HISTORY     Reviewed from Nursing Triage:       Past Medical History:   Diagnosis Date    Asthma        No past surgical history on file.    No family history on file.    Social History     Tobacco Use    Smoking status: Never    Smokeless tobacco: Never   Substance Use Topics    Alcohol use: No    Drug use: Yes     Types: Marijuana         Review of Systems   REVIEW OF SYSTEMS     Review of Systems   Constitutional:  Negative for chills, diaphoresis, fatigue and fever.   HENT:  Negative for congestion, rhinorrhea, sinus pressure and sore throat.    Eyes:  Negative for visual disturbance.   Respiratory:  Negative for cough and shortness of breath.    Cardiovascular:  Negative for chest pain and leg swelling.   Gastrointestinal:  Negative for abdominal pain, diarrhea, nausea and vomiting.   Genitourinary:  Negative for dysuria, flank pain, frequency and hematuria.   Musculoskeletal:  Negative for back pain.   Skin:  Negative for rash.   Neurological:  Negative for dizziness, weakness, light-headedness, numbness and headaches.   Psychiatric/Behavioral:  The patient is not nervous/anxious.          VITALS     ED Vitals      Date/Time Temp Pulse Resp BP SpO2 [facility]   11/28/24 2256 36.7 °C (98.1  °F) 85 18 108/53 100 % [facility]                         Physical Exam   PHYSICAL EXAM     Physical Exam  Constitutional:       Appearance: Normal appearance.   HENT:      Head: Normocephalic and atraumatic.   Cardiovascular:      Rate and Rhythm: Normal rate and regular rhythm.      Pulses: Normal pulses.   Pulmonary:      Effort: Pulmonary effort is normal.   Abdominal:      General: Abdomen is flat.   Musculoskeletal:      Cervical back: Normal range of motion.      Comments: Wound to lateral right leg healing appropriately without signs of erythema, edema, drainage or active infection.  Full range of motion of the leg.  No pain with micromotion   Skin:     General: Skin is warm.      Capillary Refill: Capillary refill takes less than 2 seconds.   Neurological:      General: No focal deficit present.      Mental Status: She is alert.           PROCEDURES     Procedures     DATA     Results       None            Imaging Results    None         No orders to display       Scoring tools                                  ED Course & MDM   MDM / ED COURSE / CLINICAL IMPRESSION / DISPO     Medical Decision Making  21-year-old female presenting for final rabies vaccination.  Already completed dose of Augmentin and up-to-date on tetanus.  Will obtain pregnancy test for final vaccination.  Wound healing well on exam.  No signs of overlying infection.  Discussed with attending.  Stable discharge.    Problems Addressed:  Encounter for repeat administration of rabies vaccination: acute illness or injury    Amount and/or Complexity of Data Reviewed  External Data Reviewed: notes.     Details: Off visit 9/23/2024  Labs: ordered. Decision-making details documented in ED Course.    Risk  Prescription drug management.           Clinical Impression      Encounter for repeat administration of rabies vaccination     _________________       ED Disposition   Discharge                       Leigh Dumas PA C  11/29/24 0010

## 2025-01-11 ENCOUNTER — APPOINTMENT (EMERGENCY)
Dept: RADIOLOGY | Facility: HOSPITAL | Age: 30
End: 2025-01-11
Payer: COMMERCIAL

## 2025-01-11 ENCOUNTER — HOSPITAL ENCOUNTER (EMERGENCY)
Facility: HOSPITAL | Age: 30
Discharge: HOME | End: 2025-01-11
Attending: EMERGENCY MEDICINE | Admitting: EMERGENCY MEDICINE
Payer: COMMERCIAL

## 2025-01-11 VITALS
DIASTOLIC BLOOD PRESSURE: 58 MMHG | WEIGHT: 136 LBS | SYSTOLIC BLOOD PRESSURE: 104 MMHG | TEMPERATURE: 99.8 F | HEIGHT: 63 IN | HEART RATE: 79 BPM | BODY MASS INDEX: 24.1 KG/M2 | RESPIRATION RATE: 18 BRPM | OXYGEN SATURATION: 98 %

## 2025-01-11 DIAGNOSIS — U07.1 COVID-19: Primary | ICD-10-CM

## 2025-01-11 LAB
ALBUMIN SERPL-MCNC: 4.2 G/DL (ref 3.5–5.7)
ALP SERPL-CCNC: 34 IU/L (ref 34–125)
ALT SERPL-CCNC: 10 IU/L (ref 7–52)
ANION GAP SERPL CALC-SCNC: 8 MEQ/L (ref 3–15)
AST SERPL-CCNC: 17 IU/L (ref 13–39)
BASOPHILS # BLD: 0.04 K/UL (ref 0.01–0.1)
BASOPHILS NFR BLD: 0.8 %
BILIRUB SERPL-MCNC: 0.3 MG/DL (ref 0.3–1.2)
BUN SERPL-MCNC: 10 MG/DL (ref 7–25)
CALCIUM SERPL-MCNC: 9.1 MG/DL (ref 8.6–10.3)
CHLORIDE SERPL-SCNC: 104 MEQ/L (ref 98–107)
CO2 SERPL-SCNC: 23 MEQ/L (ref 21–31)
CREAT SERPL-MCNC: 0.6 MG/DL (ref 0.6–1.2)
DIFFERENTIAL METHOD BLD: ABNORMAL
EGFRCR SERPLBLD CKD-EPI 2021: >60 ML/MIN/1.73M*2
EOSINOPHIL # BLD: 0 K/UL (ref 0.04–0.36)
EOSINOPHIL NFR BLD: 0 %
ERYTHROCYTE [DISTWIDTH] IN BLOOD BY AUTOMATED COUNT: 14.2 % (ref 11.7–14.4)
FLUAV RNA SPEC QL NAA+PROBE: NEGATIVE
FLUBV RNA SPEC QL NAA+PROBE: NEGATIVE
GLUCOSE SERPL-MCNC: 89 MG/DL (ref 70–99)
HCG UR QL: NEGATIVE
HCT VFR BLD AUTO: 37.4 % (ref 35–45)
HGB BLD-MCNC: 12.3 G/DL (ref 11.8–15.7)
IMM GRANULOCYTES # BLD AUTO: 0.01 K/UL (ref 0–0.08)
IMM GRANULOCYTES NFR BLD AUTO: 0.2 %
LIPASE SERPL-CCNC: 9 U/L (ref 11–82)
LYMPHOCYTES # BLD: 0.7 K/UL (ref 1.2–3.5)
LYMPHOCYTES NFR BLD: 13.4 %
MAGNESIUM SERPL-MCNC: 1.9 MG/DL (ref 1.8–2.5)
MCH RBC QN AUTO: 29.8 PG (ref 28–33.2)
MCHC RBC AUTO-ENTMCNC: 32.9 G/DL (ref 32.2–35.5)
MCV RBC AUTO: 90.6 FL (ref 83–98)
MONOCYTES # BLD: 0.86 K/UL (ref 0.28–0.8)
MONOCYTES NFR BLD: 16.4 %
NEUTROPHILS # BLD: 3.63 K/UL (ref 1.7–7)
NEUTS SEG NFR BLD: 69.2 %
NRBC BLD-RTO: 0 %
PLATELET # BLD AUTO: 222 K/UL (ref 150–369)
PMV BLD AUTO: 9.4 FL (ref 9.4–12.3)
POTASSIUM SERPL-SCNC: 3.4 MEQ/L (ref 3.5–5.1)
PROT SERPL-MCNC: 7.1 G/DL (ref 6–8.2)
RBC # BLD AUTO: 4.13 M/UL (ref 3.93–5.22)
RSV RNA SPEC QL NAA+PROBE: NEGATIVE
SARS-COV-2 RNA RESP QL NAA+PROBE: POSITIVE
SODIUM SERPL-SCNC: 135 MEQ/L (ref 136–145)
WBC # BLD AUTO: 5.24 K/UL (ref 3.8–10.5)

## 2025-01-11 PROCEDURE — 85025 COMPLETE CBC W/AUTO DIFF WBC: CPT

## 2025-01-11 PROCEDURE — 63700000 HC SELF-ADMINISTRABLE DRUG: Performed by: PHYSICIAN ASSISTANT

## 2025-01-11 PROCEDURE — 84703 CHORIONIC GONADOTROPIN ASSAY: CPT | Performed by: PHYSICIAN ASSISTANT

## 2025-01-11 PROCEDURE — 71045 X-RAY EXAM CHEST 1 VIEW: CPT

## 2025-01-11 PROCEDURE — 80053 COMPREHEN METABOLIC PANEL: CPT

## 2025-01-11 PROCEDURE — 83690 ASSAY OF LIPASE: CPT | Performed by: PHYSICIAN ASSISTANT

## 2025-01-11 PROCEDURE — 83735 ASSAY OF MAGNESIUM: CPT | Performed by: PHYSICIAN ASSISTANT

## 2025-01-11 PROCEDURE — 87637 SARSCOV2&INF A&B&RSV AMP PRB: CPT

## 2025-01-11 PROCEDURE — 96374 THER/PROPH/DIAG INJ IV PUSH: CPT

## 2025-01-11 PROCEDURE — 87637 SARSCOV2&INF A&B&RSV AMP PRB: CPT | Performed by: EMERGENCY MEDICINE

## 2025-01-11 PROCEDURE — 36415 COLL VENOUS BLD VENIPUNCTURE: CPT | Performed by: PHYSICIAN ASSISTANT

## 2025-01-11 PROCEDURE — 99284 EMERGENCY DEPT VISIT MOD MDM: CPT | Mod: U5,25

## 2025-01-11 PROCEDURE — 63600000 HC DRUGS/DETAIL CODE: Mod: JZ | Performed by: PHYSICIAN ASSISTANT

## 2025-01-11 PROCEDURE — 3E033GC INTRODUCTION OF OTHER THERAPEUTIC SUBSTANCE INTO PERIPHERAL VEIN, PERCUTANEOUS APPROACH: ICD-10-PCS | Performed by: EMERGENCY MEDICINE

## 2025-01-11 PROCEDURE — 80053 COMPREHEN METABOLIC PANEL: CPT | Performed by: EMERGENCY MEDICINE

## 2025-01-11 PROCEDURE — 85025 COMPLETE CBC W/AUTO DIFF WBC: CPT | Performed by: EMERGENCY MEDICINE

## 2025-01-11 RX ORDER — BENZONATATE 100 MG/1
100 CAPSULE ORAL 3 TIMES DAILY PRN
Qty: 30 CAPSULE | Refills: 0 | Status: SHIPPED | OUTPATIENT
Start: 2025-01-11

## 2025-01-11 RX ORDER — IBUPROFEN 600 MG/1
600 TABLET ORAL EVERY 6 HOURS PRN
Qty: 30 TABLET | Refills: 0 | Status: SHIPPED | OUTPATIENT
Start: 2025-01-11

## 2025-01-11 RX ORDER — ACETAMINOPHEN 325 MG/1
975 TABLET ORAL ONCE
Status: COMPLETED | OUTPATIENT
Start: 2025-01-11 | End: 2025-01-11

## 2025-01-11 RX ORDER — ONDANSETRON HYDROCHLORIDE 2 MG/ML
4 INJECTION, SOLUTION INTRAVENOUS ONCE
Status: COMPLETED | OUTPATIENT
Start: 2025-01-11 | End: 2025-01-11

## 2025-01-11 RX ORDER — BENZONATATE 100 MG/1
100 CAPSULE ORAL
Status: COMPLETED | OUTPATIENT
Start: 2025-01-11 | End: 2025-01-11

## 2025-01-11 RX ORDER — ONDANSETRON 4 MG/1
4 TABLET, ORALLY DISINTEGRATING ORAL EVERY 8 HOURS PRN
Qty: 12 TABLET | Refills: 0 | Status: SHIPPED | OUTPATIENT
Start: 2025-01-11

## 2025-01-11 RX ADMIN — ONDANSETRON 4 MG: 2 INJECTION INTRAMUSCULAR; INTRAVENOUS at 16:45

## 2025-01-11 RX ADMIN — BENZONATATE 100 MG: 100 CAPSULE ORAL at 16:46

## 2025-01-11 RX ADMIN — ACETAMINOPHEN 975 MG: 325 TABLET ORAL at 16:46

## 2025-01-11 ASSESSMENT — ENCOUNTER SYMPTOMS
MYALGIAS: 1
DYSURIA: 0
DECREASED PHYSICAL ACTIVITY: 1
NAUSEA: 1
WEAKNESS: 1
DECREASED APPETITE: 1
FATIGUE: 1
CHILLS: 1
DIARRHEA: 0
NUMBNESS: 0
FEVER: 1
SHORTNESS OF BREATH: 0
NECK STIFFNESS: 0
FLU SYMPTOMS: 1
HEADACHES: 1
VOMITING: 1
COUGH: 1
ABDOMINAL PAIN: 0

## 2025-01-11 NOTE — ED PROVIDER NOTES
Emergency Medicine Note  HPI   HISTORY OF PRESENT ILLNESS       History provided by:  Patient  Flu Symptoms  Presenting symptoms: cough, fatigue, fever (subjective), headache, myalgias, nausea and vomiting (started overnight)    Presenting symptoms: no diarrhea and no shortness of breath    Duration: started 2-3 days ago.  Chronicity:  New  Associated symptoms: chills, decreased appetite, decreased physical activity and nasal congestion    Associated symptoms: no neck stiffness          Patient History   PAST HISTORY     Reviewed from Nursing Triage:       Past Medical History:   Diagnosis Date    Asthma        No past surgical history on file.    No family history on file.    Social History     Tobacco Use    Smoking status: Never    Smokeless tobacco: Never   Substance Use Topics    Alcohol use: No    Drug use: Yes     Types: Marijuana         Review of Systems   REVIEW OF SYSTEMS     Review of Systems   Constitutional:  Positive for chills, decreased appetite, fatigue and fever (subjective).   HENT:  Positive for congestion.    Respiratory:  Positive for cough. Negative for shortness of breath.    Cardiovascular:  Negative for chest pain.   Gastrointestinal:  Positive for nausea and vomiting (started overnight). Negative for abdominal pain and diarrhea.   Genitourinary:  Negative for dysuria.   Musculoskeletal:  Positive for myalgias. Negative for neck stiffness.   Neurological:  Positive for weakness and headaches. Negative for syncope and numbness.         VITALS     ED Vitals      Date/Time Temp Pulse Resp BP SpO2 [facility]   01/11/25 1800 -- 79 18 104/58 98 % [facility]   01/11/25 1730 -- 79 18 103/60 98 % [facility]   01/11/25 1700 -- 79 18 118/58 98 % [facility]   01/11/25 1407 37.7 °C (99.8 °F) 102 18 119/58 99 % [facility]          Pulse Ox %: 99 % (01/11/25 1554)  Pulse Ox Interpretation: Normal (01/11/25 1554)           Physical Exam   PHYSICAL EXAM     Physical Exam  Vitals and nursing note reviewed.   Constitutional:       Appearance:  Normal appearance.   HENT:      Head: Normocephalic.      Right Ear: External ear normal.      Left Ear: External ear normal.   Eyes:      Extraocular Movements: Extraocular movements intact.      Conjunctiva/sclera: Conjunctivae normal.      Pupils: Pupils are equal, round, and reactive to light.   Cardiovascular:      Rate and Rhythm: Normal rate and regular rhythm.      Pulses: Normal pulses.   Pulmonary:      Effort: Pulmonary effort is normal.      Breath sounds: Normal breath sounds. No wheezing or rales.   Abdominal:      General: There is no distension.      Palpations: Abdomen is soft.      Tenderness: There is no abdominal tenderness. There is no guarding.   Musculoskeletal:         General: Normal range of motion.      Cervical back: Normal range of motion and neck supple. No rigidity.      Right lower leg: No edema.      Left lower leg: No edema.   Skin:     General: Skin is warm and dry.   Neurological:      General: No focal deficit present.      Mental Status: She is alert and oriented to person, place, and time.           PROCEDURES     Procedures     DATA     Results       Procedure Component Value Units Date/Time    Magnesium [[id number]]  (Normal) Collected: 01/11/25 1606    Specimen: Blood, Venous Updated: 01/11/25 1701     Magnesium 1.9 mg/dL     Comprehensive metabolic panel [[id number]]  (Abnormal) Collected: 01/11/25 1606    Specimen: Blood, Venous Updated: 01/11/25 1700     Sodium 135 mEQ/L      Potassium 3.4 mEQ/L      Comment: Results obtained on plasma. Plasma Potassium values may be up to 0.4 mEQ/L less than serum values. The differences may be greater for patients with high platelet or white cell counts.        Chloride 104 mEQ/L      CO2 23 mEQ/L      BUN 10 mg/dL      Creatinine 0.6 mg/dL      Glucose 89 mg/dL      Calcium 9.1 mg/dL      AST (SGOT) 17 IU/L      ALT (SGPT) 10 IU/L      Alkaline Phosphatase 34 IU/L      Total Protein 7.1 g/dL      Comment: Test performed on plasma which  typically contains approximately 0.4 g/dL more protein than serum.        Albumin 4.2 g/dL      Bilirubin, Total 0.3 mg/dL      eGFR >60.0 mL/min/1.73m*2      Comment: Calculation based on the Chronic Kidney Disease Epidemiology Collaboration (CKD-EPI) equation refit without adjustment for race.        Anion Gap 8 mEQ/L     Lipase [[id number]]  (Abnormal) Collected: 01/11/25 1606    Specimen: Blood, Venous Updated: 01/11/25 1700     Lipase 9 U/L     BhCG, Qual (Serum) [[id number]]  (Normal) Collected: 01/11/25 1606    Specimen: Blood, Venous Updated: 01/11/25 1647     Preg Test, Serum Negative    CBC and differential [[id number]]  (Abnormal) Collected: 01/11/25 1606    Specimen: Blood, Venous Updated: 01/11/25 1630     WBC 5.24 K/uL      RBC 4.13 M/uL      Hemoglobin 12.3 g/dL      Hematocrit 37.4 %      MCV 90.6 fL      MCH 29.8 pg      MCHC 32.9 g/dL      RDW 14.2 %      Platelets 222 K/uL      MPV 9.4 fL      Differential Type Auto     nRBC 0.0 %      Immature Granulocytes 0.2 %      Neutrophils 69.2 %      Lymphocytes 13.4 %      Monocytes 16.4 %      Eosinophils 0.0 %      Basophils 0.8 %      Immature Granulocytes, Absolute 0.01 K/uL      Neutrophils, Absolute 3.63 K/uL      Lymphocytes, Absolute 0.70 K/uL      Monocytes, Absolute 0.86 K/uL      Eosinophils, Absolute 0.00 K/uL      Basophils, Absolute 0.04 K/uL     SARS-COV-2 (COVID-19)/ FLU A/B, AND RSV, PCR Nasopharynx [[id number]]  (Abnormal) Collected: 01/11/25 1410    Specimen: Nasopharyngeal Swab from Nasopharynx Updated: 01/11/25 1541     SARS-CoV-2 (COVID-19) Positive     Influenza A Negative     Influenza B Negative     Respiratory Syncytial Virus Negative    Narrative:      Testing performed using real-time PCR for detection of COVID-19. EUA approved validation studies performed on site.             Imaging Results              X-RAY CHEST 1 VIEW (Final result)  Result time 01/11/25 17:06:03      Final result                   Impression:     IMPRESSION:    No active disease.               Narrative:    CLINICAL HISTORY: Chest pain    AP portable view of the chest.    COMPARISON: 12/19/2023    COMMENT:    The heart is normal in size.  The cardiomediastinal silhouette is unremarkable.  The lungs are clear. The visualized soft tissue and osseous structures are  unremarkable.                                      No orders to display       Scoring tools                                  ED Course & MDM   MDM / ED COURSE / CLINICAL IMPRESSION / DISPO     Medical Decision Making      ED Course as of 01/11/25 1937   Sat Jan 11, 2025   1710 Pt presents today for evaluation of 2-3 days of flu like symptoms. Pt COVID 19+ but well appearing. O2 sat 98% on RA  CXR clear  Labs reviewed and reassuring  Pt tolerating PO fluids  Will d/c with continued supportive care.   Pt agrees with plan [NH]      ED Course User Index  [NH] Edith Hale PA C     Clinical Impression      COVID-19     _________________       ED Disposition   Discharge                       Edith Hale PA C  01/11/25 1937

## 2025-01-12 NOTE — ED ATTESTATION NOTE
The patient was evaluated and managed by the physician assistant.  This case was discussed with me and I was available for consultation.       Hans Mustafa MD  01/11/25 2007

## 2025-01-14 ENCOUNTER — RX ONLY (RX ONLY)
Age: 30
End: 2025-01-14

## 2025-01-14 RX ORDER — CLOBETASOL PROPIONATE 0.5 MG/ML
SOLUTION TOPICAL
Qty: 50 | Refills: 0 | Status: ERX

## 2025-01-14 RX ORDER — KETOCONAZOLE 20 MG/ML
SHAMPOO, SUSPENSION TOPICAL
Qty: 120 | Refills: 0 | Status: ERX